# Patient Record
Sex: FEMALE | Race: WHITE | NOT HISPANIC OR LATINO | Employment: FULL TIME | URBAN - METROPOLITAN AREA
[De-identification: names, ages, dates, MRNs, and addresses within clinical notes are randomized per-mention and may not be internally consistent; named-entity substitution may affect disease eponyms.]

---

## 2018-12-12 ENCOUNTER — OFFICE VISIT (OUTPATIENT)
Dept: URGENT CARE | Facility: CLINIC | Age: 53
End: 2018-12-12

## 2018-12-12 VITALS
HEART RATE: 116 BPM | HEIGHT: 62 IN | RESPIRATION RATE: 16 BRPM | SYSTOLIC BLOOD PRESSURE: 110 MMHG | OXYGEN SATURATION: 96 % | DIASTOLIC BLOOD PRESSURE: 66 MMHG | TEMPERATURE: 98 F | BODY MASS INDEX: 36.8 KG/M2 | WEIGHT: 200 LBS

## 2018-12-12 DIAGNOSIS — K52.9 NONINFECTIOUS GASTROENTERITIS, UNSPECIFIED TYPE: ICD-10-CM

## 2018-12-12 DIAGNOSIS — J06.9 UPPER RESPIRATORY TRACT INFECTION, UNSPECIFIED TYPE: Primary | ICD-10-CM

## 2018-12-12 PROCEDURE — 99213 OFFICE O/P EST LOW 20 MIN: CPT | Performed by: EMERGENCY MEDICINE

## 2018-12-12 RX ORDER — TAMOXIFEN CITRATE 10 MG/1
10 TABLET ORAL 2 TIMES DAILY
COMMUNITY

## 2018-12-12 NOTE — LETTER
December 12, 2018     Patient: Giuliana Burr   YOB: 1965   Date of Visit: 12/12/2018       To Whom It May Concern: It is my medical opinion that Giuliana Burr may return to work on 12/12/2018  If you have any questions or concerns, please don't hesitate to call           Sincerely,        Kimberly Duong MD    CC: No Recipients

## 2018-12-12 NOTE — PATIENT INSTRUCTIONS
Clear liquid diet, advance to bland solids as tolerated (Bananas, Rice, Applesauce, Toast), decongestants as needed, rest, recheck as needed

## 2018-12-13 NOTE — PROGRESS NOTES
Assessment/Plan:    No problem-specific Assessment & Plan notes found for this encounter  Diagnoses and all orders for this visit:    Upper respiratory tract infection, unspecified type    Noninfectious gastroenteritis, unspecified type    Other orders  -     tamoxifen (NOLVADEX) 10 mg tablet; Take 10 mg by mouth 2 (two) times a day          Subjective:      Patient ID: Wilfredo Calhoun is a 47 y o  female  Pt states she had the "flu" for 5 days, needs a note to return to work  States she had cold, runny nose, sore throat, followed by  N & V, diarrhea for 3 days; now is able to tolerate fluids, still feels weak, though  I explained that neither of these processes was the flu, but that she would be OK to return to work      Vomiting    This is a new problem  The current episode started in the past 7 days  The problem occurs intermittently  The problem has been resolved  The emesis has an appearance of bile  The maximum temperature recorded prior to her arrival was 100 4 - 100 9 F  Associated symptoms include abdominal pain, coughing, diarrhea and URI  She has tried nothing for the symptoms  The treatment provided no relief  URI    This is a new problem  The current episode started in the past 7 days  The problem has been gradually improving  The maximum temperature recorded prior to her arrival was 100 4 - 100 9 F  Associated symptoms include abdominal pain, congestion, coughing, diarrhea, rhinorrhea and vomiting  She has tried nothing for the symptoms  The treatment provided no relief  The following portions of the patient's history were reviewed and updated as appropriate: current medications, past family history, past medical history, past social history, past surgical history and problem list     Review of Systems   HENT: Positive for congestion and rhinorrhea  Respiratory: Positive for cough  Pt is a smoker   Gastrointestinal: Positive for abdominal pain, diarrhea and vomiting  All other systems reviewed and are negative  Objective:      /66   Pulse (!) 116   Temp 98 °F (36 7 °C)   Resp 16   Ht 5' 2" (1 575 m)   Wt 90 7 kg (200 lb)   SpO2 96%   BMI 36 58 kg/m²          Physical Exam   Constitutional: She is oriented to person, place, and time  She appears well-developed and well-nourished  HENT:   Right Ear: External ear normal    Left Ear: External ear normal    Nose: Nose normal    Mouth/Throat: Oropharynx is clear and moist    Eyes: Pupils are equal, round, and reactive to light  Neck: Normal range of motion  Cardiovascular: Normal rate  Pulmonary/Chest: Effort normal    Abdominal: Soft  Neurological: She is alert and oriented to person, place, and time  Skin: Skin is warm and dry  Psychiatric: She has a normal mood and affect  Her behavior is normal  Judgment and thought content normal    Nursing note and vitals reviewed

## 2018-12-14 ENCOUNTER — APPOINTMENT (EMERGENCY)
Dept: RADIOLOGY | Facility: HOSPITAL | Age: 53
DRG: 379 | End: 2018-12-14
Payer: COMMERCIAL

## 2018-12-14 ENCOUNTER — HOSPITAL ENCOUNTER (INPATIENT)
Facility: HOSPITAL | Age: 53
LOS: 3 days | Discharge: HOME | DRG: 379 | End: 2018-12-17
Attending: EMERGENCY MEDICINE | Admitting: INTERNAL MEDICINE
Payer: COMMERCIAL

## 2018-12-14 ENCOUNTER — APPOINTMENT (EMERGENCY)
Dept: CT IMAGING | Facility: HOSPITAL | Age: 53
DRG: 379 | End: 2018-12-14
Payer: COMMERCIAL

## 2018-12-14 DIAGNOSIS — R11.2 NAUSEA AND VOMITING: ICD-10-CM

## 2018-12-14 DIAGNOSIS — R53.1 RIGHT SIDED WEAKNESS: Primary | ICD-10-CM

## 2018-12-14 DIAGNOSIS — R10.9 ABDOMINAL PAIN: ICD-10-CM

## 2018-12-14 DIAGNOSIS — K92.2 UPPER GASTROINTESTINAL BLEED: ICD-10-CM

## 2018-12-14 DIAGNOSIS — K29.01 ACUTE GASTRITIS WITH HEMORRHAGE: ICD-10-CM

## 2018-12-14 LAB
ABO GROUP BLD: NORMAL
ALBUMIN SERPL BCP-MCNC: 3.8 G/DL (ref 3.5–5)
ALP SERPL-CCNC: 72 U/L (ref 46–116)
ALT SERPL W P-5'-P-CCNC: 26 U/L (ref 12–78)
AMPHETAMINES SERPL QL SCN: NEGATIVE
ANION GAP SERPL CALCULATED.3IONS-SCNC: 16 MMOL/L (ref 4–13)
APAP SERPL-MCNC: <2 UG/ML (ref 10–30)
APTT PPP: 25 SECONDS (ref 26–38)
AST SERPL W P-5'-P-CCNC: 14 U/L (ref 5–45)
BARBITURATES UR QL: NEGATIVE
BENZODIAZ UR QL: NEGATIVE
BILIRUB SERPL-MCNC: 0.5 MG/DL (ref 0.2–1)
BLD GP AB SCN SERPL QL: NEGATIVE
BUN SERPL-MCNC: 13 MG/DL (ref 5–25)
CALCIUM SERPL-MCNC: 9.6 MG/DL (ref 8.3–10.1)
CHLORIDE SERPL-SCNC: 102 MMOL/L (ref 100–108)
CLARITY, POC: CLEAR
CO2 SERPL-SCNC: 21 MMOL/L (ref 21–32)
COCAINE UR QL: NEGATIVE
COLOR, POC: NORMAL
CREAT SERPL-MCNC: 1.27 MG/DL (ref 0.6–1.3)
ERYTHROCYTE [DISTWIDTH] IN BLOOD BY AUTOMATED COUNT: 13.7 % (ref 11.6–15.1)
ETHANOL SERPL-MCNC: <3 MG/DL (ref 0–3)
EXT BILIRUBIN, UA: NORMAL
EXT BLOOD URINE: NORMAL
EXT GLUCOSE, UA: NORMAL
EXT KETONES: NORMAL
EXT NITRITE, UA: NORMAL
EXT PH, UA: 6.2
EXT PROTEIN, UA: NORMAL
EXT SPECIFIC GRAVITY, UA: 1.01
EXT UROBILINOGEN: NORMAL
GFR SERPL CREATININE-BSD FRML MDRD: 49 ML/MIN/1.73SQ M
GLUCOSE SERPL-MCNC: 114 MG/DL (ref 65–140)
GLUCOSE SERPL-MCNC: 124 MG/DL (ref 65–140)
HCT VFR BLD AUTO: 46.7 % (ref 34.8–46.1)
HGB BLD-MCNC: 15.3 G/DL (ref 11.5–15.4)
INR PPP: 1 (ref 0.86–1.17)
LIPASE SERPL-CCNC: 64 U/L (ref 73–393)
MCH RBC QN AUTO: 28.5 PG (ref 26.8–34.3)
MCHC RBC AUTO-ENTMCNC: 32.8 G/DL (ref 31.4–37.4)
MCV RBC AUTO: 87 FL (ref 82–98)
METHADONE UR QL: NEGATIVE
OB PNL GAST: POSITIVE
OPIATES UR QL SCN: NEGATIVE
PCP UR QL: NEGATIVE
PLATELET # BLD AUTO: 415 THOUSANDS/UL (ref 149–390)
PMV BLD AUTO: 9.6 FL (ref 8.9–12.7)
POTASSIUM SERPL-SCNC: 4.3 MMOL/L (ref 3.5–5.3)
PROT SERPL-MCNC: 8.4 G/DL (ref 6.4–8.2)
PROTHROMBIN TIME: 12.6 SECONDS (ref 11.8–14.2)
RBC # BLD AUTO: 5.36 MILLION/UL (ref 3.81–5.12)
RH BLD: POSITIVE
SALICYLATES SERPL-MCNC: <3 MG/DL (ref 3–20)
SODIUM SERPL-SCNC: 139 MMOL/L (ref 136–145)
SPECIMEN EXPIRATION DATE: NORMAL
THC UR QL: POSITIVE
WBC # BLD AUTO: 16.87 THOUSAND/UL (ref 4.31–10.16)
WBC # BLD EST: NORMAL 10*3/UL

## 2018-12-14 PROCEDURE — 82271 OCCULT BLOOD OTHER SOURCES: CPT | Performed by: EMERGENCY MEDICINE

## 2018-12-14 PROCEDURE — 81002 URINALYSIS NONAUTO W/O SCOPE: CPT | Performed by: EMERGENCY MEDICINE

## 2018-12-14 PROCEDURE — 93005 ELECTROCARDIOGRAM TRACING: CPT

## 2018-12-14 PROCEDURE — 85730 THROMBOPLASTIN TIME PARTIAL: CPT | Performed by: EMERGENCY MEDICINE

## 2018-12-14 PROCEDURE — 80320 DRUG SCREEN QUANTALCOHOLS: CPT | Performed by: EMERGENCY MEDICINE

## 2018-12-14 PROCEDURE — 80307 DRUG TEST PRSMV CHEM ANLYZR: CPT | Performed by: EMERGENCY MEDICINE

## 2018-12-14 PROCEDURE — 80329 ANALGESICS NON-OPIOID 1 OR 2: CPT | Performed by: EMERGENCY MEDICINE

## 2018-12-14 PROCEDURE — 74177 CT ABD & PELVIS W/CONTRAST: CPT

## 2018-12-14 PROCEDURE — 96375 TX/PRO/DX INJ NEW DRUG ADDON: CPT

## 2018-12-14 PROCEDURE — 86901 BLOOD TYPING SEROLOGIC RH(D): CPT | Performed by: EMERGENCY MEDICINE

## 2018-12-14 PROCEDURE — 83690 ASSAY OF LIPASE: CPT | Performed by: EMERGENCY MEDICINE

## 2018-12-14 PROCEDURE — 70496 CT ANGIOGRAPHY HEAD: CPT

## 2018-12-14 PROCEDURE — 96374 THER/PROPH/DIAG INJ IV PUSH: CPT

## 2018-12-14 PROCEDURE — 99285 EMERGENCY DEPT VISIT HI MDM: CPT

## 2018-12-14 PROCEDURE — 85027 COMPLETE CBC AUTOMATED: CPT | Performed by: EMERGENCY MEDICINE

## 2018-12-14 PROCEDURE — 36415 COLL VENOUS BLD VENIPUNCTURE: CPT | Performed by: EMERGENCY MEDICINE

## 2018-12-14 PROCEDURE — 86850 RBC ANTIBODY SCREEN: CPT | Performed by: EMERGENCY MEDICINE

## 2018-12-14 PROCEDURE — 71045 X-RAY EXAM CHEST 1 VIEW: CPT

## 2018-12-14 PROCEDURE — 70498 CT ANGIOGRAPHY NECK: CPT

## 2018-12-14 PROCEDURE — 85610 PROTHROMBIN TIME: CPT | Performed by: EMERGENCY MEDICINE

## 2018-12-14 PROCEDURE — 80053 COMPREHEN METABOLIC PANEL: CPT | Performed by: EMERGENCY MEDICINE

## 2018-12-14 PROCEDURE — 86900 BLOOD TYPING SEROLOGIC ABO: CPT | Performed by: EMERGENCY MEDICINE

## 2018-12-14 PROCEDURE — 82948 REAGENT STRIP/BLOOD GLUCOSE: CPT

## 2018-12-14 RX ORDER — METOCLOPRAMIDE HYDROCHLORIDE 5 MG/ML
10 INJECTION INTRAMUSCULAR; INTRAVENOUS ONCE
Status: COMPLETED | OUTPATIENT
Start: 2018-12-14 | End: 2018-12-14

## 2018-12-14 RX ORDER — ONDANSETRON 2 MG/ML
4 INJECTION INTRAMUSCULAR; INTRAVENOUS ONCE
Status: COMPLETED | OUTPATIENT
Start: 2018-12-14 | End: 2018-12-14

## 2018-12-14 RX ADMIN — ONDANSETRON 4 MG: 2 INJECTION INTRAMUSCULAR; INTRAVENOUS at 21:57

## 2018-12-14 RX ADMIN — METOCLOPRAMIDE 10 MG: 5 INJECTION, SOLUTION INTRAMUSCULAR; INTRAVENOUS at 23:05

## 2018-12-14 RX ADMIN — IOHEXOL 85 ML: 350 INJECTION, SOLUTION INTRAVENOUS at 21:39

## 2018-12-14 NOTE — LETTER
[address] [id number]  Dept: [phone]    December 23, 2018     Patient: Natalya Darby   YOB: 1965   Date of Visit: 12/14/2018       To Whom it May Concern:    Natalya Darby is under my professional care  She was seen in the hospital from 12/14/2018   to 12/17/18  She may return to work on 12/19/2018  If you have any questions or concerns, please don't hesitate to call           Sincerely,          Arabella Pappas MD

## 2018-12-15 ENCOUNTER — APPOINTMENT (INPATIENT)
Dept: MRI IMAGING | Facility: HOSPITAL | Age: 53
DRG: 379 | End: 2018-12-15
Payer: COMMERCIAL

## 2018-12-15 PROBLEM — R11.2 NAUSEA AND VOMITING: Status: ACTIVE | Noted: 2018-12-15

## 2018-12-15 PROBLEM — R53.1 RIGHT SIDED WEAKNESS: Status: ACTIVE | Noted: 2018-12-15

## 2018-12-15 PROBLEM — K92.2 UPPER GASTROINTESTINAL BLEED: Status: ACTIVE | Noted: 2018-12-15

## 2018-12-15 LAB
ALBUMIN SERPL BCP-MCNC: 3.5 G/DL (ref 3.5–5)
ALP SERPL-CCNC: 62 U/L (ref 46–116)
ALT SERPL W P-5'-P-CCNC: 24 U/L (ref 12–78)
ANION GAP SERPL CALCULATED.3IONS-SCNC: 14 MMOL/L (ref 4–13)
AST SERPL W P-5'-P-CCNC: 22 U/L (ref 5–45)
BASOPHILS # BLD AUTO: 0.05 THOUSANDS/ΜL (ref 0–0.1)
BASOPHILS NFR BLD AUTO: 0 % (ref 0–1)
BILIRUB SERPL-MCNC: 0.4 MG/DL (ref 0.2–1)
BUN SERPL-MCNC: 11 MG/DL (ref 5–25)
CALCIUM SERPL-MCNC: 9.1 MG/DL (ref 8.3–10.1)
CHLORIDE SERPL-SCNC: 103 MMOL/L (ref 100–108)
CHOLEST SERPL-MCNC: 163 MG/DL (ref 50–200)
CO2 SERPL-SCNC: 22 MMOL/L (ref 21–32)
CREAT SERPL-MCNC: 0.92 MG/DL (ref 0.6–1.3)
EOSINOPHIL # BLD AUTO: 0 THOUSAND/ΜL (ref 0–0.61)
EOSINOPHIL NFR BLD AUTO: 0 % (ref 0–6)
ERYTHROCYTE [DISTWIDTH] IN BLOOD BY AUTOMATED COUNT: 13.9 % (ref 11.6–15.1)
EST. AVERAGE GLUCOSE BLD GHB EST-MCNC: 114 MG/DL
GFR SERPL CREATININE-BSD FRML MDRD: 72 ML/MIN/1.73SQ M
GLUCOSE SERPL-MCNC: 109 MG/DL (ref 65–140)
HBA1C MFR BLD: 5.6 % (ref 4.2–6.3)
HCT VFR BLD AUTO: 43.8 % (ref 34.8–46.1)
HCT VFR BLD AUTO: 44.2 % (ref 34.8–46.1)
HCT VFR BLD AUTO: 44.4 % (ref 34.8–46.1)
HDLC SERPL-MCNC: 59 MG/DL (ref 40–60)
HGB BLD-MCNC: 14.3 G/DL (ref 11.5–15.4)
HGB BLD-MCNC: 14.5 G/DL (ref 11.5–15.4)
HGB BLD-MCNC: 14.5 G/DL (ref 11.5–15.4)
IMM GRANULOCYTES # BLD AUTO: 0.08 THOUSAND/UL (ref 0–0.2)
IMM GRANULOCYTES NFR BLD AUTO: 0 % (ref 0–2)
LDLC SERPL CALC-MCNC: 85 MG/DL (ref 0–100)
LYMPHOCYTES # BLD AUTO: 2.48 THOUSANDS/ΜL (ref 0.6–4.47)
LYMPHOCYTES NFR BLD AUTO: 13 % (ref 14–44)
MAGNESIUM SERPL-MCNC: 2.1 MG/DL (ref 1.6–2.6)
MCH RBC QN AUTO: 28.8 PG (ref 26.8–34.3)
MCHC RBC AUTO-ENTMCNC: 32.6 G/DL (ref 31.4–37.4)
MCV RBC AUTO: 88 FL (ref 82–98)
MONOCYTES # BLD AUTO: 1.37 THOUSAND/ΜL (ref 0.17–1.22)
MONOCYTES NFR BLD AUTO: 7 % (ref 4–12)
NEUTROPHILS # BLD AUTO: 15.37 THOUSANDS/ΜL (ref 1.85–7.62)
NEUTS SEG NFR BLD AUTO: 80 % (ref 43–75)
NRBC BLD AUTO-RTO: 0 /100 WBCS
PHOSPHATE SERPL-MCNC: 4.1 MG/DL (ref 2.7–4.5)
PLATELET # BLD AUTO: 368 THOUSANDS/UL (ref 149–390)
PMV BLD AUTO: 10.2 FL (ref 8.9–12.7)
POTASSIUM SERPL-SCNC: 3.9 MMOL/L (ref 3.5–5.3)
PROCALCITONIN SERPL-MCNC: <0.05 NG/ML
PROT SERPL-MCNC: 7.7 G/DL (ref 6.4–8.2)
RBC # BLD AUTO: 4.97 MILLION/UL (ref 3.81–5.12)
SODIUM SERPL-SCNC: 139 MMOL/L (ref 136–145)
TRIGL SERPL-MCNC: 93 MG/DL
WBC # BLD AUTO: 19.35 THOUSAND/UL (ref 4.31–10.16)

## 2018-12-15 PROCEDURE — 84145 PROCALCITONIN (PCT): CPT | Performed by: INTERNAL MEDICINE

## 2018-12-15 PROCEDURE — 99255 IP/OBS CONSLTJ NEW/EST HI 80: CPT | Performed by: PSYCHIATRY & NEUROLOGY

## 2018-12-15 PROCEDURE — 80061 LIPID PANEL: CPT | Performed by: INTERNAL MEDICINE

## 2018-12-15 PROCEDURE — 85018 HEMOGLOBIN: CPT | Performed by: INTERNAL MEDICINE

## 2018-12-15 PROCEDURE — 80053 COMPREHEN METABOLIC PANEL: CPT | Performed by: INTERNAL MEDICINE

## 2018-12-15 PROCEDURE — C9113 INJ PANTOPRAZOLE SODIUM, VIA: HCPCS | Performed by: INTERNAL MEDICINE

## 2018-12-15 PROCEDURE — 83735 ASSAY OF MAGNESIUM: CPT | Performed by: INTERNAL MEDICINE

## 2018-12-15 PROCEDURE — 85025 COMPLETE CBC W/AUTO DIFF WBC: CPT | Performed by: INTERNAL MEDICINE

## 2018-12-15 PROCEDURE — 83036 HEMOGLOBIN GLYCOSYLATED A1C: CPT | Performed by: INTERNAL MEDICINE

## 2018-12-15 PROCEDURE — 99223 1ST HOSP IP/OBS HIGH 75: CPT | Performed by: INTERNAL MEDICINE

## 2018-12-15 PROCEDURE — 84100 ASSAY OF PHOSPHORUS: CPT | Performed by: INTERNAL MEDICINE

## 2018-12-15 PROCEDURE — 85014 HEMATOCRIT: CPT | Performed by: INTERNAL MEDICINE

## 2018-12-15 PROCEDURE — 70551 MRI BRAIN STEM W/O DYE: CPT

## 2018-12-15 RX ORDER — ACETAMINOPHEN 325 MG/1
650 TABLET ORAL EVERY 4 HOURS PRN
Status: DISCONTINUED | OUTPATIENT
Start: 2018-12-15 | End: 2018-12-17 | Stop reason: HOSPADM

## 2018-12-15 RX ORDER — ATORVASTATIN CALCIUM 40 MG/1
40 TABLET, FILM COATED ORAL EVERY EVENING
Status: DISCONTINUED | OUTPATIENT
Start: 2018-12-15 | End: 2018-12-16

## 2018-12-15 RX ORDER — NICOTINE 21 MG/24HR
1 PATCH, TRANSDERMAL 24 HOURS TRANSDERMAL DAILY
Status: DISCONTINUED | OUTPATIENT
Start: 2018-12-15 | End: 2018-12-17 | Stop reason: HOSPADM

## 2018-12-15 RX ORDER — ONDANSETRON 2 MG/ML
4 INJECTION INTRAMUSCULAR; INTRAVENOUS EVERY 6 HOURS PRN
Status: DISCONTINUED | OUTPATIENT
Start: 2018-12-15 | End: 2018-12-17 | Stop reason: HOSPADM

## 2018-12-15 RX ORDER — TAMOXIFEN CITRATE 10 MG/1
10 TABLET ORAL DAILY
Status: DISCONTINUED | OUTPATIENT
Start: 2018-12-15 | End: 2018-12-17 | Stop reason: HOSPADM

## 2018-12-15 RX ORDER — TAMOXIFEN CITRATE 10 MG/1
10 TABLET ORAL DAILY
COMMUNITY

## 2018-12-15 RX ORDER — PANTOPRAZOLE SODIUM 40 MG/1
40 INJECTION, POWDER, FOR SOLUTION INTRAVENOUS EVERY 12 HOURS SCHEDULED
Status: DISCONTINUED | OUTPATIENT
Start: 2018-12-15 | End: 2018-12-17 | Stop reason: HOSPADM

## 2018-12-15 RX ADMIN — PANTOPRAZOLE SODIUM 40 MG: 40 INJECTION, POWDER, FOR SOLUTION INTRAVENOUS at 01:49

## 2018-12-15 RX ADMIN — PANTOPRAZOLE SODIUM 40 MG: 40 INJECTION, POWDER, FOR SOLUTION INTRAVENOUS at 09:18

## 2018-12-15 RX ADMIN — PANTOPRAZOLE SODIUM 40 MG: 40 INJECTION, POWDER, FOR SOLUTION INTRAVENOUS at 21:58

## 2018-12-15 RX ADMIN — ONDANSETRON 4 MG: 2 INJECTION, SOLUTION INTRAMUSCULAR; INTRAVENOUS at 23:37

## 2018-12-15 RX ADMIN — TAMOXIFEN CITRATE 10 MG: 10 TABLET ORAL at 09:18

## 2018-12-15 RX ADMIN — ATORVASTATIN CALCIUM 40 MG: 40 TABLET, FILM COATED ORAL at 17:58

## 2018-12-15 NOTE — ED PROVIDER NOTES
History  Chief Complaint   Patient presents with    Vomiting     Pt c/o of nuaea and vomiting and generlized pain  47year old female presents for evaluation of nausea, vomiting and weakness  Patient states that she had been feeling unwell since 4 am on 12/14 with multiple episodes of bilious emesis  Patient states that she has been hurting "all over" with myalgias, abdominal pain and headache beginning after onset of the nausea and vomiting  Patient denies fevers, chills or URI symptoms  Patient reports generalized weakness with her symptoms; however, EMS noted right sided weakness during transit  Unclear time of onset of symptoms  Patient noted to be able to use her right upper and lower extremities when not being directly examined  No blood thinners  No recent trauma  Patient takes tamoxifen for history of breast cancer s/p right lumpectomy  History provided by:  Patient and EMS personnel  CVA/TIA-like Symptoms   Presenting symptoms: headaches and weakness    Weakness:     Severity:  Unable to specify    Onset quality:  Unable to specify    Timing:  Unable to specify    Progression:  Worsening    Chronicity:  New  Onset quality:  Unable to specify  Timing:  Unable to specify  Progression:  Worsening  Associated symptoms: nausea and vomiting    Associated symptoms: no chest pain, no fever and no neck pain    Nausea:     Severity:  Severe    Onset quality:  Sudden    Duration:  17 hours    Timing:  Constant    Progression:  Worsening  Vomiting:     Quality:  Bilious material    Number of occurrences:  Multiple    Severity:  Severe    Duration:  17 hours    Timing:  Constant    Progression:  Unchanged      Prior to Admission Medications   Prescriptions Last Dose Informant Patient Reported?  Taking?   tamoxifen (NOLVADEX) 10 mg tablet   Yes Yes   Sig: Take 10 mg by mouth daily        Facility-Administered Medications: None       Past Medical History:   Diagnosis Date    Cholecystolithiasis        Past Surgical History:   Procedure Laterality Date    BREAST LUMPECTOMY Right     CHOLECYSTECTOMY      HYSTERECTOMY      KNEE SURGERY      TONSILLECTOMY         Family History   Problem Relation Age of Onset    Cancer Mother     Heart disease Father      I have reviewed and agree with the history as documented  Social History   Substance Use Topics    Smoking status: Current Every Day Smoker     Packs/day: 1 00     Years: 40 00    Smokeless tobacco: Never Used    Alcohol use No        Review of Systems   Constitutional: Negative for appetite change, chills and fever  HENT: Negative for congestion, rhinorrhea and sore throat  Respiratory: Negative for cough, chest tightness and shortness of breath  Cardiovascular: Negative for chest pain, palpitations and leg swelling  Gastrointestinal: Positive for abdominal pain, nausea and vomiting  Negative for constipation and diarrhea  Genitourinary: Negative for dysuria, frequency and hematuria  Musculoskeletal: Positive for myalgias  Negative for neck pain and neck stiffness  Skin: Negative for pallor  Neurological: Positive for weakness and headaches  Negative for syncope  All other systems reviewed and are negative  Physical Exam  Physical Exam   Constitutional: She is oriented to person, place, and time  She appears well-developed and well-nourished  Non-toxic appearance  No distress  HENT:   Head: Normocephalic and atraumatic  Eyes: Pupils are equal, round, and reactive to light  Conjunctivae and EOM are normal    Neck: Normal range of motion  Neck supple  No tracheal deviation present  No thyromegaly present  Cardiovascular: Normal rate, regular rhythm, normal heart sounds and intact distal pulses  Pulmonary/Chest: Effort normal and breath sounds normal    Abdominal: Soft  Bowel sounds are normal  She exhibits no distension  There is no tenderness  Lymphadenopathy:     She has no cervical adenopathy     Neurological: She is alert and oriented to person, place, and time  A sensory deficit (decreased sensation right lower extremity) is present  No cranial nerve deficit  She exhibits normal muscle tone  4/5 strength right upper extremity, 3/5 strength right lower extremity  5/5 strength left upper and lower extremity  Skin: Skin is warm and dry  She is not diaphoretic  Nursing note and vitals reviewed        Vital Signs  ED Triage Vitals   Temperature Pulse Respirations Blood Pressure SpO2   12/14/18 2100 12/14/18 2100 12/14/18 2100 12/14/18 2100 12/14/18 2100   97 7 °F (36 5 °C) 66 19 163/82 95 %      Temp Source Heart Rate Source Patient Position - Orthostatic VS BP Location FiO2 (%)   12/14/18 2100 12/14/18 2100 12/14/18 2100 12/15/18 0046 --   Tympanic Monitor Lying Right arm       Pain Score       12/15/18 0046       2           Vitals:    12/14/18 2145 12/14/18 2215 12/15/18 0046 12/15/18 0254   BP: (!) 158/104 (!) 158/104 167/80 137/84   Pulse: 87 71 67 82   Patient Position - Orthostatic VS: Lying  Lying Lying       Visual Acuity  Visual Acuity      Most Recent Value   L Pupil Size (mm)  2   R Pupil Size (mm)  2          ED Medications  Medications   tamoxifen (NOLVADEX) tablet 10 mg (not administered)   ondansetron (ZOFRAN) injection 4 mg (not administered)   acetaminophen (TYLENOL) tablet 650 mg (not administered)   nicotine (NICODERM CQ) 21 mg/24 hr TD 24 hr patch 1 patch (not administered)   atorvastatin (LIPITOR) tablet 40 mg (not administered)   pantoprazole (PROTONIX) injection 40 mg (40 mg Intravenous Given 12/15/18 0149)   influenza vaccine, recombinant, quadrivalent (FLUBLOK) IM injection 0 5 mL (not administered)   iohexol (OMNIPAQUE) 350 MG/ML injection (MULTI-DOSE) 85 mL (85 mL Intravenous Given 12/14/18 2139)   ondansetron (ZOFRAN) injection 4 mg (4 mg Intravenous Given 12/14/18 2157)   metoclopramide (REGLAN) injection 10 mg (10 mg Intravenous Given 12/14/18 2305)       Diagnostic Studies  Results Reviewed Procedure Component Value Units Date/Time    Occult blood gastric / duodenum [[id number]]  (Abnormal) Collected:  12/14/18 2304    Lab Status:  Final result Specimen: Body Fluid from Stomach Updated:  12/14/18 2336     Occult Blood, Gastric Positive (A)    Rapid drug screen, urine [[id number]]  (Abnormal) Collected:  12/14/18 2253    Lab Status:  Final result Specimen:  Urine from Urine, Clean Catch Updated:  12/14/18 2315     Amph/Meth UR Negative     Barbiturate Ur Negative     Benzodiazepine Urine Negative     Cocaine Urine Negative     Methadone Urine Negative     Opiate Urine Negative     PCP Ur Negative     THC Urine Positive (A)    Narrative:         Presumptive report  If requested, specimen will be sent to reference lab for confirmation  FOR MEDICAL PURPOSES ONLY  IF CONFIRMATION NEEDED PLEASE CONTACT THE LAB WITHIN 5 DAYS      Drug Screen Cutoff Levels:  AMPHETAMINE/METHAMPHETAMINES  1000 ng/mL  BARBITURATES     200 ng/mL  BENZODIAZEPINES     200 ng/mL  COCAINE      300 ng/mL  METHADONE      300 ng/mL  OPIATES      300 ng/mL  PHENCYCLIDINE     25 ng/mL  THC       50 ng/mL    UA w Reflex to Microscopic w Reflex to Culture [[id number]]     Lab Status:  No result Specimen:  Urine     POCT urinalysis dipstick [[id number]]  (Normal) Resulted:  12/14/18 2309    Lab Status:  Final result Specimen:  Urine Updated:  12/14/18 2310     Color, UA yello     Clarity, UA clear     Glucose, UA (Ref: Negative) neg     Bilirubin, UA (Ref: Negative) neg     Ketones, UA (Ref: Negative) trace     Spec Grav, UA (Ref:1 003-1 030) 1 010     Blood, UA (Ref: Negative) tarce     pH, UA (Ref: 4 5-8 0) 6 2     Protein, UA (Ref: Negative) neg     Urobilinogen, UA (Ref: 0 2- 1 0) neg      Leukocytes, UA (Ref: Negative) neg     Nitrite, UA (Ref: Negative) neg    Ethanol [[id number]]  (Normal) Collected:  12/14/18 2202    Lab Status:  Final result Specimen:  Blood Updated:  12/14/18 2256     Ethanol Lvl <3 mg/dL     Salicylate level [[id number]]  (Abnormal) Collected:  12/14/18 2202    Lab Status:  Final result Specimen:  Blood Updated:  70/78/84 4559     Salicylate Lvl <3 (L) mg/dL     Acetaminophen level [[id number]]  (Abnormal) Collected:  12/14/18 2202    Lab Status:  Final result Specimen:  Blood Updated:  12/14/18 2251     Acetaminophen Level <2 0 (L) ug/mL     Comprehensive metabolic panel [[id number]]  (Abnormal) Collected:  12/14/18 2202    Lab Status:  Final result Specimen:  Blood from Line, Venous Updated:  12/14/18 2235     Sodium 139 mmol/L      Potassium 4 3 mmol/L      Chloride 102 mmol/L      CO2 21 mmol/L      ANION GAP 16 (H) mmol/L      BUN 13 mg/dL      Creatinine 1 27 mg/dL      Glucose 124 mg/dL      Calcium 9 6 mg/dL      AST 14 U/L      ALT 26 U/L      Alkaline Phosphatase 72 U/L      Total Protein 8 4 (H) g/dL      Albumin 3 8 g/dL      Total Bilirubin 0 50 mg/dL      eGFR 49 ml/min/1 73sq m     Narrative:         National Kidney Disease Education Program recommendations are as follows:  GFR calculation is accurate only with a steady state creatinine  Chronic Kidney disease less than 60 ml/min/1 73 sq  meters  Kidney failure less than 15 ml/min/1 73 sq  meters      Lipase [[id number]]  (Abnormal) Collected:  12/14/18 2202    Lab Status:  Final result Specimen:  Blood from Line, Venous Updated:  12/14/18 2235     Lipase 64 (L) u/L     APTT [[id number]]  (Abnormal) Collected:  12/14/18 2202    Lab Status:  Final result Specimen:  Blood from Line, Venous Updated:  12/14/18 2232     PTT 25 (L) seconds     Protime-INR [[id number]]  (Normal) Collected:  12/14/18 2202    Lab Status:  Final result Specimen:  Blood from Line, Venous Updated:  12/14/18 2232     Protime 12 6 seconds      INR 1 00    CBC [[id number]]  (Abnormal) Collected:  12/14/18 2202    Lab Status:  Final result Specimen:  Blood from Line, Venous Updated:  12/14/18 2213     WBC 16 87 (H) Thousand/uL      RBC 5 36 (H) Million/uL      Hemoglobin 15 3 g/dL Hematocrit 46 7 (H) %      MCV 87 fL      MCH 28 5 pg      MCHC 32 8 g/dL      RDW 13 7 %      Platelets 718 (H) Thousands/uL      MPV 9 6 fL     Fingerstick Glucose (POCT) [[id number]]  (Normal) Collected:  12/14/18 2206    Lab Status:  Final result Updated:  12/14/18 2207     POC Glucose 114 mg/dl                  X-ray chest 1 view portable   ED Interpretation by Miya Lange MD (12/14 2311)   No acute pulmonary pathology      CT abdomen pelvis with contrast   Final Result by Benja Childress MD (12/14 2145)      No acute CT findings  Scattered colonic diverticula  Workstation performed: [id number]         CTA stroke alert (head/neck)   Final Result by Benja Childress MD (12/14 2138)      No evidence of acute intracranial hemorrhage  No evidence of hemodynamic significant stenosis, aneurysm or dissection  Large pituitary fossa, follow-up nonemergent outpatient contrast enhanced brain MRI           I personally discussed this study with Morenita Shah on 12/14/2018 at 9:19PM                      Workstation performed: [id number]         MRI Inpatient Order    (Results Pending)              Procedures  ECG 12 Lead Documentation  Date/Time: 12/14/2018 9:50 PM  Performed by: Elle Limaer by: Karlo Lal     Indications / Diagnosis:  Possible stroke  ECG reviewed by me, the ED Provider: yes    Patient location:  ED  Previous ECG:     Previous ECG:  Unavailable  Interpretation:     Interpretation: normal    Rate:     ECG rate:  73    ECG rate assessment: normal    Rhythm:     Rhythm: sinus rhythm    Ectopy:     Ectopy: none    QRS:     QRS axis:  Normal    QRS intervals:  Normal  Conduction:     Conduction: normal    ST segments:     ST segments:  Normal  T waves:     T waves: inverted      Inverted:  AVL and V2           Phone Contacts  ED Phone Contact    ED Course  ED Course as of Dec 15 0305   Fri Dec 14, 2018   [facility] URINE: (!) Positive                   Stroke Assessment [address] Name 12/14/18 3165             NIH Stroke Scale    Interval Baseline      Level of Consciousness (1a ) 0      LOC Questions (1b ) 0      LOC Commands (1c ) 0      Best Gaze (2 ) 0      Visual (3 ) 0      Facial Palsy (4 ) 0      Motor Arm, Left (5a ) 0      Motor Arm, Right (5b ) 0      Motor Leg, Left (6a ) 0      Motor Leg, Right (6b ) 2      Limb Ataxia (7 ) 0      Sensory (8 ) 1      Best Language (9 ) 0      Dysarthria (10 ) 0      Extinction and Inattention (11 ) (Formerly Neglect) 0      Total 3                First Filed Value   TPA Decision  Patient not a TPA candidate  Patient is not a candidate options  Unclear time of onset outside appropriate time window  MDM  Number of Diagnoses or Management Options  Abdominal pain: new and requires workup  Nausea and vomiting: new and requires workup  Right sided weakness: new and requires workup  Upper gastrointestinal bleed: new and requires workup  Diagnosis management comments: 47year old female presents for evaluation of nausea, vomiting, body aches and right sided weakness  Stroke alert called on arrival  NIHSS 3  Concern for possible conversion disorder as symptoms only apparent during direct testing  Unclear time of onset of symptoms as patient was not with family and does not remember when she first noticed the weakness  tPA not given due to unclear time of onset  CTA head and neck unremarkable  Patient retching in ED despite 4 mg zofran by EMS and additional 4 mg zofran after arrival  10 mg reglan given with resolution of nausea and vomiting; however, patient noted to have coffee ground component to emesis  Emesis sent to lab for Gastroccult which was positive  Given bleeding diathesis risk of tpa likely exceeds benefit with low NIHSS and fluctuating symptoms  Likely gastric ulcer vs earl krista tear  CT abd/pelvis ordered for generalized tenderness on exam and was unremarkable   Patient admitted for further evaluation and management  Amount and/or Complexity of Data Reviewed  Clinical lab tests: ordered and reviewed  Tests in the radiology section of CPT®: ordered and reviewed  Independent visualization of images, tracings, or specimens: yes    Patient Progress  Patient progress: stable    CritCare Time    Disposition  Final diagnoses:   Right sided weakness   Upper gastrointestinal bleed   Nausea and vomiting   Abdominal pain     Time reflects when diagnosis was documented in both MDM as applicable and the Disposition within this note     Time User Action Codes Description Comment    12/14/2018  8:57 PM Ary Fothergill Add [R53 1] Right sided weakness     12/14/2018 11:38 PM Ary Fothergill Add [K92 2] Upper gastrointestinal bleed     12/14/2018 11:38 PM Ary Fothergill Add [R11 2] Nausea and vomiting     12/14/2018 11:38 PM Cesario POWELL Add [R10 9] Abdominal pain     12/15/2018  1:05 AM SalujaOumarAj Modify [R53 1] Right sided weakness     12/15/2018  1:05 AM SalOumar brunereep Modify [K92 2] Upper gastrointestinal bleed     12/15/2018  1:05 AM SaluOumar brunoeep Modify [R11 2] Nausea and vomiting       ED Disposition     ED Disposition Condition Comment    Admit  Case was discussed with GERTRUDE and the patient's admission status was agreed to be Admission Status: inpatient status to the service of Dr Mariano Urrutia   Follow-up Information    None         Current Discharge Medication List      CONTINUE these medications which have NOT CHANGED    Details   tamoxifen (NOLVADEX) 10 mg tablet Take 10 mg by mouth daily             No discharge procedures on file      ED Provider  Electronically Signed by           Luiz Valdes MD  12/15/18 0959       Luiz Valdes MD  12/15/18 7174

## 2018-12-15 NOTE — PROGRESS NOTES
Patients right grasp significantly weaker than the left  Patient stated this was her baseline d/t an injury that occurred in the summer of 2018

## 2018-12-15 NOTE — UTILIZATION REVIEW
Initial Clinical Review    Admission: Date/Time/Statement: 12/14/18 @ 2339     Orders Placed This Encounter   Procedures    Inpatient Admission (expected length of stay for this patient is greater than two midnights)     Standing Status:   Standing     Number of Occurrences:   1     Order Specific Question:   Admitting Physician     Answer:   Óscar Souza [[id number]]     Order Specific Question:   Level of Care     Answer:   Med Surg [16]     Order Specific Question:   Estimated length of stay     Answer:   More than 2 Midnights     Order Specific Question:   Certification     Answer:   I certify that inpatient services are medically necessary for this patient for a duration of greater than two midnights  See H&P and MD Progress Notes for additional information about the patient's course of treatment  ED: Date/Time/Mode of Arrival:   ED Arrival Information     Expected Arrival Acuity Means of Arrival Escorted By Service Admission Type    - 12/14/2018 20:56 Urgent Ambulance [id number] [facility] General Medicine Urgent    [address]          Chief Complaint:   Chief Complaint   Patient presents with    Vomiting     Pt c/o of nuaea and vomiting and generlized pain  History of Illness: Nico Christensen is a 47 y o  female who presented to the emergency department with complain of nausea, intractable vomiting and weakness  Patient states that she started having nausea associated with multiple episodes of vomiting since morning around 4:30 a m  On December 14th  She also had associated diarrhea  She states her vomiting stopped in afternoon but then she has been having constant dry heaves/retching  She developed abdominal pain which is generalized 5/10 intensity, sharp, constant, nonradiating  She complained of generalized weakness stand EMS was called  Patient was noticed to have right-sided weakness  No slurred speech, facial droop, diplopia, visual disturbance    In ER patient was found to have right-sided weakness and stroke alert was initiated  Patient underwent CTA head and neck neurology was consulted  Patient also had CT abdomen pelvis done in ER which was unremarkable  Patient states that she recently moved here to the area from [place]        ED Vital Signs:   ED Triage Vitals   Temperature Pulse Respirations Blood Pressure SpO2   12/14/18 2100 12/14/18 2100 12/14/18 2100 12/14/18 2100 12/14/18 2100   97 7 °F (36 5 °C) 66 19 163/82 95 %      Temp Source Heart Rate Source Patient Position - Orthostatic VS BP Location FiO2 (%)   12/14/18 2100 12/14/18 2100 12/14/18 2100 12/15/18 0046 --   Tympanic Monitor Lying Right arm       Pain Score       12/15/18 0046       2        Wt Readings from Last 1 Encounters:   12/15/18 91 kg (200 lb 9 9 oz)       Vital Signs (abnormal):    above    Abnormal Labs/Diagnostic Test Results:    UDS  +  THC  AG  16  WBC   16 87  Platelets  705  CXR:  NAD  CT abd/pelvis:      No acute  Findings  CTA   Head/neck:   No acute  Intracranial  hemorrhage    ED Treatment:   Medication Administration from 12/14/2018 2056 to 12/15/2018 0043       Date/Time Order Dose Route Action Action by Comments     12/14/2018 2139 iohexol (OMNIPAQUE) 350 MG/ML injection (MULTI-DOSE) 85 mL 85 mL Intravenous Given Anthony Foreman      12/14/2018 2157 ondansetron (ZOFRAN) injection 4 mg 4 mg Intravenous Given Wiley Fowler RN      12/14/2018 2305 metoclopramide (REGLAN) injection 10 mg 10 mg Intravenous Given Wiley Fowler RN           Past Medical/Surgical History:    Active Ambulatory Problems     Diagnosis Date Noted    No Active Ambulatory Problems     Resolved Ambulatory Problems     Diagnosis Date Noted    No Resolved Ambulatory Problems     Past Medical History:   Diagnosis Date    Cholecystolithiasis        Admitting Diagnosis: Abdominal pain [R10 9]  Nausea and vomiting [R11 2]  Upper gastrointestinal bleed [K92 2]  CVA (cerebral vascular accident) [facility]) [I63 9]  Right sided weakness [R53 1]    Age/Sex: 47 y o  female    Assessment/Plan: Admit to med surgical floor on telemetry as inpatient status  · Right-sided weakness rule out CVA  Neuro checks  Stroke pathway  Echocardiogram, MRI brain  Neurology consult  Hold aspirin secondary to upper GI bleed  HB A1c, lipid profile  Start on Lipitor  Telemetry monitoring  · Nausea, vomiting and diarrhea-possible gastroenteritis likely viral   · Upper GI bleed likely secondary to intractable vomiting/retching? Anahi-Park  Will start on Protonix 40 mg IV twice daily  IV fluids  GI consult  Antiemetics  Monitor H&H q 6 hours and transfuse as needed  Avoid antiplatelet and anticoagulation secondary to GI bleed  · Smoking cessation counseling given  On nicotine patch  · DVT prophylaxis with SCD  · Discussed with patient in detail  Anticipated length of stay greater than 2 midnights  Admission Orders:   IP    12/14  @  0610  Scheduled Meds:   Current Facility-Administered Medications:  acetaminophen 650 mg Oral Q4H PRN Allison Baker MD   atorvastatin 40 mg Oral QPM Allison Baker MD   influenza vaccine 0 5 mL Intramuscular Prior to discharge Allison Baker MD   nicotine 1 patch Transdermal Daily Allison Baker MD   ondansetron 4 mg Intravenous Q6H PRN Allison Baker MD   pantoprazole 40 mg Intravenous Q12H Dm Guevara MD   tamoxifen 10 mg Oral Daily Allison Baker MD     Continuous Infusions:    PRN Meds:   acetaminophen    influenza vaccine    ondansetron     Tele  Stroke  Teaching  Dysphagia  eval  PT/OT/speech eval  2 DE  MRI  Brain  H/H   Q  6 hrs  Dysphagia  eval  Neuro  Checks   Q 1 hr X 4,  Q 2 hrs  X 4   Then  Q  4  Hrs  X  72      Per  Neuro  Consult:  Patient presented with chief complaint of intractable nausea vomiting, with abdominal pain, was incidentally discovered to have some right-sided weakness    Her motor testing though is contaminated by give-way quality and impersistent effort with testing  There is no drift, incoordination, or reported sensory asymmetry  I think it is quite unlikely that this represents a stroke, or any other CNS structural lesion, but with prior history of breast CA additional potential concerns might include metastasis  Her concurrent vomiting might also invoke concerns for posterior fossa event, but that is also less likely in the absence of other brainstem or cerebellar findings  It seems more probable to me that this Lattie Crescent may represent conversion symptoms      She does have significant leukocytosis, without documented fever, unclear etiology        Plan:  MRI of the brain has been requested as part of her stroke workup  It is not unreasonable to pursue that in order to better exclude any ischemic, mass, or inflammatory lesion undetected on CT  Further investigation of her abdominal complaints, leukocytosis, as per primary team, GI consult  If her MRI is completed, and does not show any structural lesion, then I would remove her from stroke pathway, without need for additional testing in that regard  She has evidently refused telemetry  Given low suspicion for CVA, I think we can do without that, until/unless MRI is positive  Aspirin has been withheld due to Hemoccult positive emesis  Again given a relatively lower suspicion for stroke, I would not change that decision    [address] [facility] Review Department  Phone: [phone]; Fax [phone]  [email or web address]  ATTENTION: Please call with any questions or concerns to [phone]  and carefully listen to the prompts so that you are directed to the right person  Send all requests for admission clinical reviews, approved or denied determinations and any other requests to fax [phone]   All voicemails are confidential

## 2018-12-15 NOTE — PROGRESS NOTES
Patient adamantly refusing to wear telemetry monitor  Nursing supervisor notified    Educated patient on importance of telemetry and patient responded "I'm not here because of my heart "

## 2018-12-15 NOTE — SPEECH THERAPY NOTE
Speech Language/Pathology  Pt adm w/ nausea, vomiting, abd pain & some R sided weakness  Pt w/o facial weakness or deficits w/ speech  Tolerating diet & passed nsg screen for dysphagia  No formal eval at this time  Please reconsult as needed

## 2018-12-15 NOTE — CONSULTS
Consultation - GI   Socorro Ryan 47 y o  female MRN: [id number]  Unit/Bed#: [address] [place] [address] Encounter: [id number]    Inpatient consult to gastroenterology  Consult performed by: Shalonda Comment ordered by: Rizwana Campos        ASSESSMENT  Nausea vomiting and hematemesis - 67-year-old female without any significant GI history who presents with acute onset yesterday in of nausea vomiting with streaks of blood  This is possibly associated with some neurologic symptoms  Blood-streaked emesis is most likely due to Anahi-Park tear in the setting of nausea and retching  Esophagitis, peptic ulcer disease possible but less likely  Advised empiric PPI and antiemetics  Neurologic workup in progress  Consider EGD when stable from a neurovascular prospective  PLAN  IV PPI  IV antiemetics as needed  Follow H&H and observe for any further bleeding  Neurologic workup as planned to exclude CVA  EGD when clinically stable from a neurovascular prospective    Chief Complaint   Patient presents with    Vomiting     Pt c/o of nuaea and vomiting and generlized pain  HPI 67-year-old female without any significant GI history presented with 1 day of nausea vomiting and hematemesis  Also has some associated diarrhea but no melena or hematochezia  Diarrhea has resolved since coming to hospital   Patient continues to be nauseous with less vomiting and no hematemesis  Denies any focal abdominal pain but just feels tender in the abdomen  Denies aspirin or NSAIDs  Denies alcohol  No drug use  No ill contacts  Denies vertigo  Acknowledges some right-sided numbness and feeling generally weak      Past Medical History:   Diagnosis Date    Cholecystolithiasis        Past Surgical History:   Procedure Laterality Date    BREAST LUMPECTOMY Right     CHOLECYSTECTOMY      HYSTERECTOMY      KNEE SURGERY      TONSILLECTOMY         Prescriptions Prior to Admission   Medication    tamoxifen (NOLVADEX) 10 mg tablet       No Known Allergies    Social History     Family History   Problem Relation Age of Onset    Cancer Mother     Heart disease Father        Review of Systems - Negative except as per HPI nausea, anorexia, abdominal discomfort  Denies cardiac or pulmonary or genitourinary symptoms  Does have some arthralgias  Reports headaches  As per HPI new onset weakness and numbness  History of mood disorder  /85 (BP Location: Left arm)   Pulse 80   Temp 98 7 °F (37 1 °C) (Oral)   Resp 20   Ht 5' 2" (1 575 m)   Wt 91 kg (200 lb 9 9 oz)   SpO2 100%   BMI 36 69 kg/m²     PHYSICALEXAM  Patient is awake alert and oriented  HEENT is anicteric and conjunctiva pink  Lungs are clear  Heart regular rhythm  The abdomen is soft and protuberant with normoactive bowel sounds  There is mild diffuse tenderness without rebound or guarding  No mass organomegaly are appreciated  Extremities are without edema      Lab Results   Component Value Date    CALCIUM 9 1 12/15/2018    K 3 9 12/15/2018    CO2 22 12/15/2018     12/15/2018    BUN 11 12/15/2018    CREATININE 0 92 12/15/2018     Lab Results   Component Value Date    WBC 19 35 (H) 12/15/2018    HGB 14 5 12/15/2018    HCT 44 4 12/15/2018    MCV 88 12/15/2018     12/15/2018     Lab Results   Component Value Date    ALT 24 12/15/2018    AST 22 12/15/2018    ALKPHOS 62 12/15/2018     No components found for: AMYLJKJJJASE  Lab Results   Component Value Date    LIPASE 64 (L) 12/14/2018     No results found for: IRON, TIBC, FERRITIN  Lab Results   Component Value Date    INR 1 00 12/14/2018

## 2018-12-15 NOTE — CONSULTS
Consultation - Neurology   Socorro Brown [facility]  47 y o  female MRN: [id number]  Unit/Bed#: [address] [place] [id number] Encounter: [id number]      Assessment/Plan   Assessment:  Patient presented with chief complaint of intractable nausea vomiting, with abdominal pain, was incidentally discovered to have some right-sided weakness  Her motor testing though is contaminated by give-way quality and impersistent effort with testing  There is no drift, incoordination, or reported sensory asymmetry  I think it is quite unlikely that this represents a stroke, or any other CNS structural lesion, but with prior history of breast CA additional potential concerns might include metastasis  Her concurrent vomiting might also invoke concerns for posterior fossa event, but that is also less likely in the absence of other brainstem or cerebellar findings  It seems more probable to me that this Amie Colace may represent conversion symptoms  She does have significant leukocytosis, without documented fever, unclear etiology      Plan:  MRI of the brain has been requested as part of her stroke workup  It is not unreasonable to pursue that in order to better exclude any ischemic, mass, or inflammatory lesion undetected on CT  Further investigation of her abdominal complaints, leukocytosis, as per primary team, GI consult  If her MRI is completed, and does not show any structural lesion, then I would remove her from stroke pathway, without need for additional testing in that regard  She has evidently refused telemetry  Given low suspicion for CVA, I think we can do without that, until/unless MRI is positive  Aspirin has been withheld due to Hemoccult positive emesis    Again given a relatively lower suspicion for stroke, I would not change that decision    Physician Requesting Consult: Remington Sandoval MD  Reason for Consult / Principal Problem:  Right-sided weakness    HPI: Tanisha Pedraza is a 47y o  year old female who presents with chief complaint of intractable nausea and vomiting  She states this began in the early morning hours yesterday with multiple episodes of retching and vomiting  She did report at least subjective attendant fever, but denies any sick contacts  Evidently, EMS recognized some right-sided weakness without facial droop, which was also noted by ED physician, therefore stroke alert was activated  Neurology on-call advised against tPA given unknown time of onset, as well as ED physician's assessment of more give-way quality  Her neuro imaging studies (CT/CTA were unremarkable)  Patient reports she had some associated numbness around her nose, right hand, right leg which are now resolved  Denies any visual change, change in speech  She does admit to some unsteady gait that she attributes to her multiple episodes of vomiting  She denies any prior history of stroke, TIA, seizure  She does have prior history of lumpectomy, currently on tamoxifen    Inpatient consult to Neurology  Consult performed by: Gerber Liang ordered by: Kiara Ramos          Review of Systems   Constitutional: Positive for activity change, appetite change, fatigue and fever  HENT: Negative  Eyes: Negative  Respiratory: Positive for cough  Cardiovascular: Negative  Gastrointestinal: Positive for abdominal pain, constipation, diarrhea, nausea and vomiting  Endocrine: Negative  Genitourinary: Negative  Musculoskeletal: Positive for arthralgias  Skin: Negative  Allergic/Immunologic: Negative  Neurological: Positive for weakness, numbness and headaches  Negative for tremors, seizures, syncope and speech difficulty  Hematological: Negative  Psychiatric/Behavioral: Positive for dysphoric mood         Historical Information   Past Medical History:   Diagnosis Date    Cholecystolithiasis      Past Surgical History:   Procedure Laterality Date    BREAST LUMPECTOMY Right     CHOLECYSTECTOMY      HYSTERECTOMY      KNEE SURGERY      TONSILLECTOMY       Social History   History   Alcohol Use No     History   Drug Use    Types: Marijuana     Comment: "very rare", smoked a few days ago to calm stomach     History   Smoking Status    Current Every Day Smoker    Packs/day: 1 00    Years: 40 00   Smokeless Tobacco    Never Used     Family History: non-contributory    Review of previous medical records was  completed  No prior or outside records available    Meds/Allergies   all current active meds have been reviewed, current meds:   Current Facility-Administered Medications   Medication Dose Route Frequency    acetaminophen (TYLENOL) tablet 650 mg  650 mg Oral Q4H PRN    atorvastatin (LIPITOR) tablet 40 mg  40 mg Oral QPM    influenza vaccine, recombinant, quadrivalent (FLUBLOK) IM injection 0 5 mL  0 5 mL Intramuscular Prior to discharge    nicotine (NICODERM CQ) 21 mg/24 hr TD 24 hr patch 1 patch  1 patch Transdermal Daily    ondansetron (ZOFRAN) injection 4 mg  4 mg Intravenous Q6H PRN    pantoprazole (PROTONIX) injection 40 mg  40 mg Intravenous Q12H [facility]    tamoxifen (NOLVADEX) tablet 10 mg  10 mg Oral Daily    and PTA meds:   Prior to Admission Medications   Prescriptions Last Dose Informant Patient Reported? Taking?   tamoxifen (NOLVADEX) 10 mg tablet   Yes Yes   Sig: Take 10 mg by mouth daily        Facility-Administered Medications: None       No Known Allergies    Objective   Vitals:Blood pressure 140/76, pulse 75, temperature 98 8 °F (37 1 °C), temperature source Oral, resp  rate 18, height 5' 2" (1 575 m), weight 91 kg (200 lb 9 9 oz), SpO2 100 %  ,Body mass index is 36 69 kg/m²  No intake or output data in the 24 hours ending 12/15/18 0838    Invasive Devices: Invasive Devices     Peripheral Intravenous Line            Peripheral IV 12/14/18 Right Antecubital less than 1 day                Physical Exam   Constitutional: She appears well-developed and well-nourished  No distress  HENT:   Head: Normocephalic and atraumatic  Mouth/Throat: No oropharyngeal exudate  Eyes: Pupils are equal, round, and reactive to light  Conjunctivae and EOM are normal  No scleral icterus  Neck: Normal range of motion  Neck supple  No thyromegaly present  Cardiovascular: Normal rate and regular rhythm  Exam reveals no gallop and no friction rub  No murmur heard  Pulmonary/Chest: Effort normal and breath sounds normal  No respiratory distress  Abdominal: Soft  She exhibits no distension  There is tenderness  There is no guarding  Musculoskeletal: Normal range of motion  She exhibits no edema  Neurological: She has a normal Finger-Nose-Finger Test and a normal Heel to [employer]    Reflex Scores:       Tricep reflexes are 2+ on the right side and 2+ on the left side  Bicep reflexes are 2+ on the right side and 2+ on the left side  Brachioradialis reflexes are 2+ on the right side and 2+ on the left side  Patellar reflexes are 2+ on the right side and 2+ on the left side  Achilles reflexes are 2+ on the right side and 2+ on the left side  Skin: Skin is warm and dry  No rash noted  She is not diaphoretic  No erythema  Psychiatric: Her speech is normal    Vitals reviewed  Neurologic Exam     Mental Status   Oriented to person  Oriented to place  Disoriented to time  Disoriented to date  Oriented to year, month, day and season  Follows 2 step commands  Attention: normal  Concentration: normal    Speech: speech is normal   Level of consciousness: alert  Able to name object  Able to read  Able to repeat  Normal comprehension  Cranial Nerves     CN II   Visual fields full to confrontation  CN III, IV, VI   Pupils are equal, round, and reactive to light  Extraocular motions are normal    Nystagmus: none     CN V   Facial sensation intact  CN VII   Facial expression full, symmetric       CN VIII   CN VIII normal      CN IX, X   CN IX normal    CN X normal  CN XI   Left sternocleidomastoid strength: weak (Giveaway weakness in maintaining rightward head turn )    CN XII   CN XII normal      Motor Exam   Overall muscle tone: normal  Right arm pronator drift: absent  Left arm pronator drift: absent    Strength   Strength 5/5 except as noted  Poor effort and give-way weakness right-sided limbs, normal on the left  Reports some pain inhibition at right wrist and knee from prior injuries     Sensory Exam   Light touch normal    Vibration normal    Pinprick normal      Gait, Coordination, and Reflexes     Coordination   Finger to nose coordination: normal  Heel to shin coordination: normal    Reflexes   Right brachioradialis: 2+  Left brachioradialis: 2+  Right biceps: 2+  Left biceps: 2+  Right triceps: 2+  Left triceps: 2+  Right patellar: 2+  Left patellar: 2+  Right achilles: 2+  Left achilles: 2+  Right plantar: normal  Left plantar: normal      Lab Results:   I have personally reviewed pertinent reports    , CBC:   Results from last 7 days  Lab Units 12/15/18  0540 12/14/18  2202   WBC Thousand/uL 19 35* 16 87*   RBC Million/uL 4 97 5 36*   HEMOGLOBIN g/dL 14 3 15 3   HEMATOCRIT % 43 8 46 7*   MCV fL 88 87   PLATELETS Thousands/uL 368 415*   , BMP/CMP:   Results from last 7 days  Lab Units 12/15/18  0540 12/14/18  2202   SODIUM mmol/L 139 139   POTASSIUM mmol/L 3 9 4 3   CHLORIDE mmol/L 103 102   CO2 mmol/L 22 21   BUN mg/dL 11 13   CREATININE mg/dL 0 92 1 27   CALCIUM mg/dL 9 1 9 6   AST U/L 22 14   ALT U/L 24 26   ALK PHOS U/L 62 72   EGFR ml/min/1 73sq m 72 49   , HgBA1C:   , Coagulation:   Results from last 7 days  Lab Units 12/14/18  2202   INR  1 00   , Urinalysis:   Results from last 7 days  Lab Units 12/14/18  2309   COLOR UA  yello   CLARITY UA  clear   SPEC GRAV US  1 010   PH UA  6 2   LEUKOCYTES UA  neg   NITRITE UA  neg   GLUCOSE UA  neg   KETONES UA  trace   BILIRUBIN, UA  neg   BLOOD UA  tarce     Imaging Studies: I have personally reviewed pertinent films in PACS  CT head and CTA head and neck both unremarkable  EKG, Pathology, and Other Studies: I have personally reviewed pertinent reports          Code Status: Level 3 - DNAR and DNI

## 2018-12-15 NOTE — H&P
History and Physical  Socorro Ryan 47 y o  female MRN: [id number]  Unit/Bed#: [address] [place] [address] Encounter: [id number]    Admitting Diagnosis:   Principal Problem:    Right sided weakness  Active Problems:    Nausea and vomiting    Upper gastrointestinal bleed  Resolved Problems:    * No resolved hospital problems  *      Plan:  Admit to med surgical floor on telemetry as inpatient status  · Right-sided weakness rule out CVA  Neuro checks  Stroke pathway  Echocardiogram, MRI brain  Neurology consult  Hold aspirin secondary to upper GI bleed  HB A1c, lipid profile  Start on Lipitor  Telemetry monitoring  · Nausea, vomiting and diarrhea-possible gastroenteritis likely viral   · Upper GI bleed likely secondary to intractable vomiting/retching? Anahi-Park  Will start on Protonix 40 mg IV twice daily  IV fluids  GI consult  Antiemetics  Monitor H&H q 6 hours and transfuse as needed  Avoid antiplatelet and anticoagulation secondary to GI bleed  · Smoking cessation counseling given  On nicotine patch  · DVT prophylaxis with SCD  · Discussed with patient in detail  Anticipated length of stay greater than 2 midnights  Chief Complaint   Patient presents with    Vomiting     Pt c/o of nuaea and vomiting and generlized pain  HPI:  Nico Christensen is a 47 y o  female who presented to the emergency department with complain of nausea, intractable vomiting and weakness  Patient states that she started having nausea associated with multiple episodes of vomiting since morning around 4:30 a m  On December 14th  She also had associated diarrhea  She states her vomiting stopped in afternoon but then she has been having constant dry heaves/retching  She developed abdominal pain which is generalized 5/10 intensity, sharp, constant, nonradiating  She complained of generalized weakness stand EMS was called  Patient was noticed to have right-sided weakness    No slurred speech, facial droop, diplopia, visual disturbance  In ER patient was found to have right-sided weakness and stroke alert was initiated  Patient underwent CTA head and neck neurology was consulted  Patient also had CT abdomen pelvis done in ER which was unremarkable  Patient states that she recently moved here to the area from [address] [facility]  Historical Information   Past Medical History:   Diagnosis Date    Cholecystolithiasis      Past Surgical History:   Procedure Laterality Date    BREAST LUMPECTOMY Right     CHOLECYSTECTOMY      HYSTERECTOMY      KNEE SURGERY      TONSILLECTOMY       Social History   History   Alcohol Use No     History   Drug Use    Types: Marijuana     Comment: "very rare", smoked a few days ago to calm stomach     History   Smoking Status    Current Every Day Smoker    Packs/day: 1 00    Years: 40 00   Smokeless Tobacco    Never Used     Family History   Problem Relation Age of Onset    Cancer Mother     Heart disease Father        Meds/Allergies   No Known Allergies    Meds:  No current facility-administered medications for this encounter  Prescriptions Prior to Admission   Medication    tamoxifen (NOLVADEX) 10 mg tablet         Review of Systems   Constitutional: Positive for activity change and fatigue  HENT: Negative  Eyes: Negative  Respiratory: Negative  Cardiovascular: Negative  Gastrointestinal: Positive for abdominal pain, nausea and vomiting  Endocrine: Negative  Genitourinary: Negative  Musculoskeletal: Negative  Skin: Negative  Allergic/Immunologic: Negative  Neurological: Positive for weakness  Right sided weakness   Hematological: Negative  Psychiatric/Behavioral: Negative          Current Vitals:   Blood Pressure: 167/80 (12/15/18 0046)  Pulse: 67 (12/15/18 0046)  Temperature: 99 3 °F (37 4 °C) (12/15/18 0046)  Temp Source: Oral (12/15/18 0046)  Respirations: 20 (12/15/18 0046)  Height: 5' 2" (157 5 cm) (12/15/18 0046)  Weight - Scale: 91 kg (200 lb 9 9 oz) (12/15/18 0046)  SpO2: 100 % (12/15/18 0046)  SPO2 RA Rest      ED to Hosp-Admission (Current) from 12/14/2018 in [facility] Surg Unit   SpO2  100 %   SpO2 Activity     O2 Device  None (Room air)   O2 Flow Rate          No intake or output data in the 24 hours ending 12/15/18 0100  Body mass index is 36 69 kg/m²  Physical Exam   Constitutional: She is oriented to person, place, and time  She appears well-developed and well-nourished  No distress  HENT:   Head: Normocephalic and atraumatic  Nose: Nose normal    Mouth/Throat: Oropharynx is clear and moist    Eyes: Pupils are equal, round, and reactive to light  Conjunctivae and EOM are normal    Neck: Normal range of motion  Neck supple  No JVD present  No tracheal deviation present  Cardiovascular: Normal rate, regular rhythm, normal heart sounds and intact distal pulses  Pulmonary/Chest: Effort normal and breath sounds normal  No respiratory distress  She has no wheezes  She has no rales  Abdominal: Soft  Bowel sounds are normal  She exhibits no distension  There is tenderness  There is no rebound and no guarding  Musculoskeletal: Normal range of motion  She exhibits no edema, tenderness or deformity  Neurological: She is alert and oriented to person, place, and time  No cranial nerve deficit  Coordination normal    Right sided weakness  Motor strength in right upper and lower extremities 3/5  No sensory deficits  No slurred speech, facial droop, no visual disturbance   Skin: Skin is warm  No rash noted  No erythema  Psychiatric: She has a normal mood and affect  Thought content normal    Nursing note and vitals reviewed        Lab Results:   CBC:   Lab Results   Component Value Date    WBC 16 87 (H) 12/14/2018    HGB 15 3 12/14/2018    HCT 46 7 (H) 12/14/2018    MCV 87 12/14/2018     (H) 12/14/2018    MCH 28 5 12/14/2018    MCHC 32 8 12/14/2018    RDW 13 7 12/14/2018    MPV 9 6 12/14/2018 CMP:  Lab Results   Component Value Date     12/14/2018    CO2 21 12/14/2018    BUN 13 12/14/2018    CREATININE 1 27 12/14/2018    CALCIUM 9 6 12/14/2018    AST 14 12/14/2018    ALT 26 12/14/2018    ALKPHOS 72 12/14/2018    EGFR 49 12/14/2018     No results found for: TROPONINI, CKMB, CKTOTAL  Coagulation:   Lab Results   Component Value Date    INR 1 00 12/14/2018    Urinalysis:  Lab Results   Component Value Date    COLORU yello 12/14/2018    CLARITYU clear 12/14/2018    SPECGRAV 1 010 12/14/2018    PHUR 6 2 12/14/2018    LEUKOCYTESUR neg 12/14/2018    NITRITE neg 12/14/2018    GLUCOSEU neg 12/14/2018    KETONESU trace 12/14/2018    BILIRUBINUR neg 12/14/2018    BLOODU tarce 12/14/2018      Amylase: No results found for: AMYLASE  Lipase:   Lab Results   Component Value Date    LIPASE 64 (L) 12/14/2018        Imaging: Ct Abdomen Pelvis With Contrast    Result Date: 12/14/2018  Narrative: CT ABDOMEN AND PELVIS WITH IV CONTRAST INDICATION:   Unspecified abdominal pain, nausea, vomiting  COMPARISON:  None  TECHNIQUE:  CT examination of the abdomen and pelvis was performed  Axial, sagittal, and coronal 2D reformatted images were created from the source data and submitted for interpretation  Radiation dose length product (DLP) for this visit:  875 mGy-cm   This examination, like all CT scans performed in the [facility], was performed utilizing techniques to minimize radiation dose exposure, including the use of iterative reconstruction and automated exposure control  IV Contrast:  85 mL of iohexol (OMNIPAQUE) iohexol (OMNIPAQUE) 350 MG/ML injection (MULTI-DOSE) 85 mL Enteric Contrast:  Enteric contrast was not administered  FINDINGS: ABDOMEN LOWER CHEST:  No clinically significant abnormality identified in the visualized lower chest  LIVER/BILIARY TREE:  Unremarkable  GALLBLADDER:  Gallbladder is surgically absent  SPLEEN:  Unremarkable  PANCREAS:  Unremarkable  ADRENAL GLANDS:  Unremarkable  KIDNEYS/URETERS:  Unremarkable  No hydronephrosis  STOMACH AND BOWEL:  There is colonic diverticulosis without evidence of acute diverticulitis  APPENDIX:  No findings to suggest appendicitis  ABDOMINOPELVIC CAVITY:  No ascites or free intraperitoneal air  No lymphadenopathy  VESSELS:  Unremarkable for patient's age  PELVIS REPRODUCTIVE ORGANS:  Patient is status post hysterectomy  URINARY BLADDER:  Unremarkable  ABDOMINAL WALL/INGUINAL REGIONS:  Unremarkable  OSSEOUS STRUCTURES:  No acute fracture or destructive osseous lesion  Impression: No acute CT findings  Scattered colonic diverticula  Workstation performed: [id number]     Cta Stroke Alert (head/neck)    Result Date: 12/14/2018  Narrative: CTA NECK AND BRAIN WITH AND WITHOUT CONTRAST INDICATION: right weakness , right-sided numbness COMPARISON:   None  TECHNIQUE:  Routine CT imaging of the Brain without contrast   Post contrast imaging was performed after administration of iodinated contrast through the neck and brain  Post contrast axial 0 625 mm images timed to opacify the arterial system  3D rendering was performed on an independent workstation  MIP reconstructions performed  Coronal reconstructions were performed of the noncontrast portion of the brain  Radiation dose length product (DLP) for this visit:  1436 mGy-cm   This examination, like all CT scans performed in the [facility], was performed utilizing techniques to minimize radiation dose exposure, including the use of iterative reconstruction and automated exposure control  IV Contrast: iohexol (OMNIPAQUE) 350 MG/ML injection (MULTI-DOSE) 85 mL  IMAGE QUALITY:   Diagnostic FINDINGS: NONCONTRAST BRAIN PARENCHYMA:  No intracranial mass, mass effect or midline shift  No acute intracranial hemorrhage  No CT signs of acute infarction  Large pituitary fossa, follow-up nonemergent outpatient contrast enhanced brain MRI   VENTRICLES AND EXTRA-AXIAL SPACES:  Normal for patient's age  Zara Dayhoff SINUSES:  Unremarkable  CERVICAL VASCULATURE AORTIC ARCH AND GREAT VESSELS:  Normal aortic arch and great vessel origins  Normal visualized subclavian vessels  RIGHT VERTEBRAL ARTERY CERVICAL SEGMENT:  Normal origin  The vessel is normal in caliber throughout the neck  LEFT VERTEBRAL ARTERY CERVICAL SEGMENT:  Normal origin  The vessel is normal in caliber throughout the neck  RIGHT EXTRACRANIAL CAROTID SEGMENT:  Normal caliber common carotid artery  Normal bifurcation and cervical internal carotid artery  No stenosis or dissection  LEFT EXTRACRANIAL CAROTID SEGMENT:  Normal caliber common carotid artery  Normal bifurcation and cervical internal carotid artery  No stenosis or dissection  NASCET criteria was used to determine the degree of internal carotid artery diameter stenosis  INTRACRANIAL VASCULATURE INTERNAL CAROTID ARTERIES:  Normal enhancement of the intracranial portions of the internal carotid arteries  Normal ophthalmic artery origins  Normal ICA terminus  ANTERIOR CIRCULATION:  Symmetric A1 segments and anterior cerebral arteries with normal enhancement  Normal anterior communicating artery  MIDDLE CEREBRAL ARTERY CIRCULATION:  M1 segment and middle cerebral artery branches demonstrate normal enhancement bilaterally  DISTAL VERTEBRAL ARTERIES:  Normal distal vertebral arteries  Posterior inferior cerebellar artery origins are normal  Normal vertebral basilar junction  BASILAR ARTERY:  Basilar artery is normal in caliber  Normal superior cerebellar arteries  POSTERIOR CEREBRAL ARTERIES: Both posterior cerebral arteries arises from the basilar tip  Both arteries demonstrate normal enhancement  Normal posterior communicating arteries  DURAL VENOUS SINUSES:  Normal  NON VASCULAR ANATOMY BONY STRUCTURES:  No acute osseous abnormality  SOFT TISSUES OF THE NECK:  Unremarkable  THORACIC INLET:  Unremarkable       Impression: No evidence of acute intracranial hemorrhage  No evidence of hemodynamic significant stenosis, aneurysm or dissection  Large pituitary fossa, follow-up nonemergent outpatient contrast enhanced brain MRI  I personally discussed this study with Vannessa Grubbs on 12/14/2018 at 9:19PM  Workstation performed: [id number]     EKG, Pathology, and Other Studies: I have personally reviewed the results  VTE Pharmacologic Prophylaxis: Reason for no pharmacologic prophylaxis GI bleed  VTE Mechanical Prophylaxis: sequential compression device    Code Status: Level 3 - DNAR and DNI    Counseling / Coordination of Care  Total floor / unit time spent today 75 minutes  Greater than 50% of total time was spent with the patient and / or family counseling and / or coordination of care       "This note has been constructed using a voice recognition system"      Max Ndiaye MD  12/15/2018, 1:00 AM

## 2018-12-15 NOTE — QUICK NOTE
Stroke alert was called for patient today  Patient came in with right-sided weakness  It was suspected that patient has give-way weakness  Because stroke alert was called we decided to get CTA of the head and neck      CTA head and neck  IMPRESSION:   No evidence of acute intracranial hemorrhage    No evidence of hemodynamic significant stenosis, aneurysm or dissection    Large pituitary fossa, follow-up nonemergent outpatient contrast enhanced brain MRI

## 2018-12-15 NOTE — ED NOTES
Pt does not want blood pressure cuff on  Tried several times pt screams, gets angry , and tries to rip it off       Laura Sanchez, BRIDGET  12/15/18 2455

## 2018-12-16 LAB
ALBUMIN SERPL BCP-MCNC: 3.5 G/DL (ref 3.5–5)
ALP SERPL-CCNC: 64 U/L (ref 46–116)
ALT SERPL W P-5'-P-CCNC: 28 U/L (ref 12–78)
ANION GAP SERPL CALCULATED.3IONS-SCNC: 13 MMOL/L (ref 4–13)
AST SERPL W P-5'-P-CCNC: 20 U/L (ref 5–45)
BASOPHILS # BLD AUTO: 0.04 THOUSANDS/ΜL (ref 0–0.1)
BASOPHILS NFR BLD AUTO: 0 % (ref 0–1)
BILIRUB SERPL-MCNC: 0.6 MG/DL (ref 0.2–1)
BUN SERPL-MCNC: 9 MG/DL (ref 5–25)
CALCIUM SERPL-MCNC: 8.9 MG/DL (ref 8.3–10.1)
CHLORIDE SERPL-SCNC: 101 MMOL/L (ref 100–108)
CO2 SERPL-SCNC: 24 MMOL/L (ref 21–32)
CREAT SERPL-MCNC: 0.89 MG/DL (ref 0.6–1.3)
EOSINOPHIL # BLD AUTO: 0.03 THOUSAND/ΜL (ref 0–0.61)
EOSINOPHIL NFR BLD AUTO: 0 % (ref 0–6)
ERYTHROCYTE [DISTWIDTH] IN BLOOD BY AUTOMATED COUNT: 14.2 % (ref 11.6–15.1)
GFR SERPL CREATININE-BSD FRML MDRD: 75 ML/MIN/1.73SQ M
GLUCOSE SERPL-MCNC: 106 MG/DL (ref 65–140)
HCT VFR BLD AUTO: 41.8 % (ref 34.8–46.1)
HCT VFR BLD AUTO: 43.5 % (ref 34.8–46.1)
HGB BLD-MCNC: 14.1 G/DL (ref 11.5–15.4)
HGB BLD-MCNC: 14.2 G/DL (ref 11.5–15.4)
IMM GRANULOCYTES # BLD AUTO: 0.03 THOUSAND/UL (ref 0–0.2)
IMM GRANULOCYTES NFR BLD AUTO: 0 % (ref 0–2)
LYMPHOCYTES # BLD AUTO: 2.95 THOUSANDS/ΜL (ref 0.6–4.47)
LYMPHOCYTES NFR BLD AUTO: 24 % (ref 14–44)
MCH RBC QN AUTO: 29.3 PG (ref 26.8–34.3)
MCHC RBC AUTO-ENTMCNC: 33.7 G/DL (ref 31.4–37.4)
MCV RBC AUTO: 87 FL (ref 82–98)
MONOCYTES # BLD AUTO: 0.98 THOUSAND/ΜL (ref 0.17–1.22)
MONOCYTES NFR BLD AUTO: 8 % (ref 4–12)
NEUTROPHILS # BLD AUTO: 8.4 THOUSANDS/ΜL (ref 1.85–7.62)
NEUTS SEG NFR BLD AUTO: 68 % (ref 43–75)
PLATELET # BLD AUTO: 320 THOUSANDS/UL (ref 149–390)
PMV BLD AUTO: 9.9 FL (ref 8.9–12.7)
POTASSIUM SERPL-SCNC: 3.5 MMOL/L (ref 3.5–5.3)
PROT SERPL-MCNC: 7.5 G/DL (ref 6.4–8.2)
RBC # BLD AUTO: 4.81 MILLION/UL (ref 3.81–5.12)
SODIUM SERPL-SCNC: 138 MMOL/L (ref 136–145)
WBC # BLD AUTO: 12.43 THOUSAND/UL (ref 4.31–10.16)

## 2018-12-16 PROCEDURE — 99232 SBSQ HOSP IP/OBS MODERATE 35: CPT | Performed by: INTERNAL MEDICINE

## 2018-12-16 PROCEDURE — 80053 COMPREHEN METABOLIC PANEL: CPT | Performed by: INTERNAL MEDICINE

## 2018-12-16 PROCEDURE — C9113 INJ PANTOPRAZOLE SODIUM, VIA: HCPCS | Performed by: INTERNAL MEDICINE

## 2018-12-16 PROCEDURE — 85014 HEMATOCRIT: CPT | Performed by: INTERNAL MEDICINE

## 2018-12-16 PROCEDURE — 85018 HEMOGLOBIN: CPT | Performed by: INTERNAL MEDICINE

## 2018-12-16 PROCEDURE — 85025 COMPLETE CBC W/AUTO DIFF WBC: CPT | Performed by: INTERNAL MEDICINE

## 2018-12-16 RX ORDER — POTASSIUM CHLORIDE 20 MEQ/1
40 TABLET, EXTENDED RELEASE ORAL ONCE
Status: COMPLETED | OUTPATIENT
Start: 2018-12-16 | End: 2018-12-16

## 2018-12-16 RX ADMIN — PANTOPRAZOLE SODIUM 40 MG: 40 INJECTION, POWDER, FOR SOLUTION INTRAVENOUS at 09:11

## 2018-12-16 RX ADMIN — POTASSIUM CHLORIDE 40 MEQ: 1500 TABLET, EXTENDED RELEASE ORAL at 16:55

## 2018-12-16 RX ADMIN — PANTOPRAZOLE SODIUM 40 MG: 40 INJECTION, POWDER, FOR SOLUTION INTRAVENOUS at 21:05

## 2018-12-16 RX ADMIN — TAMOXIFEN CITRATE 10 MG: 10 TABLET ORAL at 09:11

## 2018-12-16 NOTE — PROGRESS NOTES
Progress Note - Socorro Ryan 47 y o  female MRN: [id number]  Unit/Bed#: [address] [place] [address] Encounter: [id number]    Assessment:  Principal Problem:    Right sided weakness  Active Problems:    Nausea and vomiting    Upper gastrointestinal bleed  Resolved Problems:    * No resolved hospital problems  *      Plan:  · Right-sided weakness  MRI showed no acute infarction or hemorrhage  Will discontinue stroke pathway  Neurology consult appreciated  Continue to hold aspirin secondary to GI bleed  On Lipitor  Patient refusing telemetry monitoring  · Nausea vomiting and hematemesis  GI consult appreciated  IV Protonix and antiemetics  NPO after midnight  Patient is scheduled for EGD in a m  Monitor H&H and transfuse as need  Avoid antiplatelet and anticoagulation secondary to GI bleed  · Smoking cessation counseling given  On nicotine patch  · DVT prophylaxis with SCD  · Discussed with patient in detail  Subjective:   Patient is seen and examined at bedside  She is still having dry heaving, abdominal pain  Denies any new complaints  Afebrile  Right-sided weakness is almost resolved  All other ROS are negative  Objective:   Vitals: Blood pressure 148/93, pulse 88, temperature 98 5 °F (36 9 °C), temperature source Oral, resp  rate 20, height 5' 2" (1 575 m), weight 88 9 kg (195 lb 15 8 oz), SpO2 98 %  ,Body mass index is 35 85 kg/m²  SPO2 RA Rest      ED to Hosp-Admission (Current) from 12/14/2018 in [facility] Surg Unit   SpO2  98 %   SpO2 Activity     O2 Device  None (Room air)   O2 Flow Rate          I&O: No intake or output data in the 24 hours ending 12/16/18 1402    Physical Exam:    General- Alert, lying comfortably in bed  Not in any acute distress  HEENT- LAURIE, EOM intact  Neck- Supple, No JVD  CVS- regular, S1 and S2 normal   Chest- Bilateral Air entry, No rhochi, crackles or wheezing present    Abdomen- soft, nontender, not distended, no guarding or rigidity, BS+  Extremities-  No pedal edema, No calf tenderness  CNS-   Alert, awake and orientedx3  Right-sided motor strength 4/5    Invasive Devices     Peripheral Intravenous Line            Peripheral IV 12/14/18 Right Antecubital 1 day                      Social History  reviewed  Family History   Problem Relation Age of Onset    Cancer Mother     Heart disease Father     reviewed    Meds:  Current Facility-Administered Medications   Medication Dose Route Frequency Provider Last Rate Last Dose    acetaminophen (TYLENOL) tablet 650 mg  650 mg Oral Q4H PRN Flo Jacobsen MD        atorvastatin (LIPITOR) tablet 40 mg  40 mg Oral QPM Flo Jacobsen MD   40 mg at 12/15/18 1758    influenza vaccine, recombinant, quadrivalent (FLUBLOK) IM injection 0 5 mL  0 5 mL Intramuscular Prior to discharge Flo Jacobsen MD        nicotine (NICODERM CQ) 21 mg/24 hr TD 24 hr patch 1 patch  1 patch Transdermal Daily Flo Jacobsen MD        ondansetron (ZOFRAN) injection 4 mg  4 mg Intravenous Q6H PRN Flo Jacobsen MD   4 mg at 12/15/18 2337    pantoprazole (PROTONIX) injection 40 mg  40 mg Intravenous Q12H Delta Alejo MD   40 mg at 12/16/18 0911    tamoxifen (NOLVADEX) tablet 10 mg  10 mg Oral Daily Flo Jacobsen MD   10 mg at 12/16/18 0911      Prescriptions Prior to Admission   Medication    tamoxifen (NOLVADEX) 10 mg tablet       Labs:    Results from last 7 days  Lab Units 12/16/18  0546 12/16/18  0113 12/15/18  1832  12/15/18  0540 12/14/18  2202   WBC Thousand/uL 12 43*  --   --   --  19 35* 16 87*   HEMOGLOBIN g/dL 14 1 14 2 14 5  < > 14 3 15 3   HEMATOCRIT % 41 8 43 5 44 2  < > 43 8 46 7*   PLATELETS Thousands/uL 320  --   --   --  368 415*   NEUTROS PCT % 68  --   --   --  80*  --    LYMPHS PCT % 24  --   --   --  13*  --    MONOS PCT % 8  --   --   --  7  --    EOS PCT % 0  --   --   --  0  --    < > = values in this interval not displayed      Results from last 7 days  Lab Units 12/16/18  0546 12/15/18  0540 12/14/18  2202   POTASSIUM mmol/L 3 5 3 9 4 3   CHLORIDE mmol/L 101 103 102   CO2 mmol/L 24 22 21   BUN mg/dL 9 11 13   CREATININE mg/dL 0 89 0 92 1 27   CALCIUM mg/dL 8 9 9 1 9 6   ALK PHOS U/L 64 62 72   ALT U/L 28 24 26   AST U/L 20 22 14     No results found for: TROPONINI, CKMB, CKTOTAL    Results from last 7 days  Lab Units 12/14/18  2202   INR  1 00     No results found for: Channing Goltz, SPUTUMCULTUR      Imaging:  Results for orders placed during the hospital encounter of 12/14/18   X-ray chest 1 view portable    Narrative CHEST     INDICATION:   stroke  COMPARISON:  None    EXAM PERFORMED/VIEWS:  XR CHEST PORTABLE  portable AP semierect view  FINDINGS:    Cardiomediastinal silhouette appears unremarkable  The lungs are clear  No pneumothorax or pleural effusion  Osseous structures appear within normal limits for patient age  Impression No acute cardiopulmonary disease  Workstation performed: [id number]       No results found for this or any previous visit  Labs & Imaging: I have personally reviewed pertinent reports        VTE Pharmacologic Prophylaxis: Reason for no pharmacologic prophylaxis Hematemesis  VTE Mechanical Prophylaxis: sequential compression device    Code Status:   Level 3 - DNAR and DNI      "This note has been constructed using a voice recognition system"      Dany Smith MD  12/16/2018,2:02 PM

## 2018-12-16 NOTE — PROGRESS NOTES
Tatiana Blanca  [id number]    47 y o   female      ASSESSMENT  Nausea/ vomiting/hematemesis - continues to have nausea and retching  No further hematemesis  Taking some clears but not tolerating much food  Clinically stable with respect to GI bleeding  Apparently ruled out for acute CNS event  Esophagitis and peptic ulcer along with Anahi-Park tear still in the differential with respect to her GI issues  Will plan EGD    PLAN  Continue IV PPI  Anti medics as needed  Follow H&H  Will schedule for EGD    Chief Complaint   Patient presents with    Vomiting     Pt c/o of nuaea and vomiting and generlized pain  SUBJECTIVE/HPI   Still with nausea and now retching but no vomiting  No further hematemesis  No melena or hematochezia  States of abdomen diffusely with pain, no focal complaints  No diarrhea  Denies vertigo or lateralizing weakness or numbness  /93 (BP Location: Left arm)   Pulse 88   Temp 98 5 °F (36 9 °C) (Oral)   Resp 20   Ht 5' 2" (1 575 m)   Wt 88 9 kg (195 lb 15 8 oz)   SpO2 98%   BMI 35 85 kg/m²     PHYSICALEXAM   Lethargic but arousable  Oriented x3  Anxious  Lungs clear  Heart regular rhythm  Abdomen soft with normoactive bowel sounds  No apparent tenderness  No rebound      Lab Results   Component Value Date    CALCIUM 8 9 12/16/2018    K 3 5 12/16/2018    CO2 24 12/16/2018     12/16/2018    BUN 9 12/16/2018    CREATININE 0 89 12/16/2018     Lab Results   Component Value Date    WBC 12 43 (H) 12/16/2018    HGB 14 1 12/16/2018    HCT 41 8 12/16/2018    MCV 87 12/16/2018     12/16/2018     Lab Results   Component Value Date    ALT 28 12/16/2018    AST 20 12/16/2018    ALKPHOS 64 12/16/2018     No components found for: [id number]  Lab Results   Component Value Date    LIPASE 64 (L) 12/14/2018     No results found for: IRON, TIBC, FERRITIN  Lab Results   Component Value Date    INR 1 00 12/14/2018

## 2018-12-16 NOTE — PLAN OF CARE
Problem: Neurological Deficit  Goal: Neurological status is stable or improving  Interventions:  - Monitor and assess patient's level of consciousness, motor function, sensory function, and level of assistance needed for ADLs  - Monitor and report changes from baseline  Collaborate with interdisciplinary team to initiate plan and implement interventions as ordered  - Provide and maintain a safe environment  - Utilize seizure precautions  - Utilize fall precautions  - Utilize aspiration precautions  - Utilize bleeding precautions  Outcome: Progressing      Problem: Activity Intolerance/Impaired Mobility  Goal: Mobility/activity is maintained at optimum level for patient  Interventions:  - Assess and monitor patient  barriers to mobility and need for assistive/adaptive devices  - Assess patient's emotional response to limitations  - Collaborate with interdisciplinary team and initiate plans and interventions as ordered  - Encourage independent activity per ability   - Maintain proper body alignment  - Perform active/passive rom as tolerated/ordered  - Plan activities to conserve energy   - Turn patient   Outcome: Progressing      Problem: Communication Impairment  Goal: Ability to express needs and understand communication  Assess patient's communication skills and ability to understand information  Patient will demonstrate use of effective communication techniques, alternative methods of communication and understanding even if not able to speak  - Encourage communication and provide alternate methods of communication as needed  - Collaborate with case management/ for discharge needs  - Include patient/family/caregiver in decisions related to communication  Outcome: Progressing      Problem: Potential for Aspiration  Goal: Non-ventilated patient's risk of aspiration is minimized  Assess and monitor vital signs, respiratory status, and labs (WBC)    Monitor for signs of aspiration (tachypnea, cough, rales, wheezing, cyanosis, fever)  - Assess and monitor patient's ability to swallow  - Place patient up in chair to eat if possible  - HOB up at 90 degrees to eat if unable to get patient up into chair   - Supervise patient during oral intake  - Instruct patient to take small bites  - Instruct patient to take small single sips when taking liquids  - Follow patient-specific strategies generated by speech pathologist    Outcome: Progressing      Problem: Nutrition  Goal: Nutrition/Hydration status is improving  Monitor and assess patient's nutrition/hydration status for malnutrition (ex- brittle hair, bruises, dry skin, pale skin and conjunctiva, muscle wasting, smooth red tongue, and disorientation)  Collaborate with interdisciplinary team and initiate plan and interventions as ordered  Monitor patient's weight and dietary intake as ordered or per policy  Utilize nutrition screening tool and intervene per policy  Determine patient's food preferences and provide high-protein, high-caloric foods as appropriate  - Assist patient with eating   - Allow adequate time for meals   - Encourage patient to take dietary supplement as ordered  - Collaborate with clinical nutritionist   - Include patient/family/caregiver in decisions related to nutrition     Outcome: Progressing      Problem: PAIN - ADULT  Goal: Verbalizes/displays adequate comfort level or baseline comfort level  Interventions:  - Encourage patient to monitor pain and request assistance  - Assess pain using appropriate pain scale  - Administer analgesics based on type and severity of pain and evaluate response  - Implement non-pharmacological measures as appropriate and evaluate response  - Consider cultural and social influences on pain and pain management  - Notify physician/advanced practitioner if interventions unsuccessful or patient reports new pain   Outcome: Progressing      Problem: INFECTION - ADULT  Goal: Absence or prevention of progression during hospitalization  INTERVENTIONS:  - Assess and monitor for signs and symptoms of infection  - Monitor lab/diagnostic results  - Monitor all insertion sites, i e  indwelling lines, tubes, and drains  - Monitor endotracheal (as able) and nasal secretions for changes in amount and color  - [employer] appropriate cooling/warming therapies per order  - Administer medications as ordered  - Instruct and encourage patient and family to use good hand hygiene technique  - Identify and instruct in appropriate isolation precautions for identified infection/condition   Outcome: Progressing      Problem: SAFETY ADULT  Goal: Patient will remain free of falls  INTERVENTIONS:  - Assess patient frequently for physical needs  -  Identify cognitive and physical deficits and behaviors that affect risk of falls    -  [employer] fall precautions as indicated by assessment   - Educate patient/family on patient safety including physical limitations  - Instruct patient to call for assistance with activity based on assessment  - Modify environment to reduce risk of injury  - Consider OT/PT consult to assist with strengthening/mobility   Outcome: Progressing      Problem: DISCHARGE PLANNING  Goal: Discharge to home or other facility with appropriate resources  INTERVENTIONS:  - Identify barriers to discharge w/patient and caregiver  - Arrange for needed discharge resources and transportation as appropriate  - Identify discharge learning needs (meds, wound care, etc )  - Arrange for interpretive services to assist at discharge as needed  - Refer to Case Management Department for coordinating discharge planning if the patient needs post-hospital services based on physician/advanced practitioner order or complex needs related to functional status, cognitive ability, or social support system   Outcome: Progressing      Problem: Knowledge Deficit  Goal: Patient/family/caregiver demonstrates understanding of disease process, treatment plan, medications, and discharge instructions  Complete learning assessment and assess knowledge base  Interventions:  - Provide teaching at level of understanding  - Provide teaching via preferred learning methods   Outcome: Progressing      Problem: NEUROSENSORY - ADULT  Goal: Achieves stable or improved neurological status  INTERVENTIONS  - Monitor and report changes in neurological status  - Initiate measures to prevent increased intracranial pressure  - Maintain blood pressure and fluid volume within ordered parameters to optimize cerebral perfusion  - Monitor temperature, glucose, and sodium or any other associated labs   Initiate appropriate interventions as ordered  - Monitor for seizure activity   - Administer anti-seizure medications as ordered   Outcome: Progressing      Problem: GASTROINTESTINAL - ADULT  Goal: Minimal or absence of nausea and/or vomiting  INTERVENTIONS:  - Administer IV fluids as ordered to ensure adequate hydration  - Maintain NPO status until nausea and vomiting are resolved  - Nasogastric tube as ordered  - Administer ordered antiemetic medications as needed  - Provide nonpharmacologic comfort measures as appropriate  - Advance diet as tolerated, if ordered  - Nutrition services referral to assist patient with adequate nutrition and appropriate food choices   Outcome: Progressing      Problem: SKIN/TISSUE INTEGRITY - ADULT  Goal: Skin integrity remains intact  INTERVENTIONS  - Identify patients at risk for skin breakdown  - Assess and monitor skin integrity  - Assess and monitor nutrition and hydration status  - Monitor labs (i e  albumin)  - Assess for incontinence   - Turn and reposition patient  - Assist with mobility/ambulation  - Relieve pressure over bony prominences  - Avoid friction and shearing  - Provide appropriate hygiene as needed including keeping skin clean and dry  - Evaluate need for skin moisturizer/barrier cream  - Collaborate with interdisciplinary team (i e  Nutrition, Rehabilitation, etc )   - Patient/family teaching   Outcome: Progressing      Problem: MUSCULOSKELETAL - ADULT  Goal: Maintain or return mobility to safest level of function  INTERVENTIONS:  - Assess patient's ability to carry out ADLs; assess patient's baseline for ADL function and identify physical deficits which impact ability to perform ADLs (bathing, care of mouth/teeth, toileting, grooming, dressing, etc )  - Assess/evaluate cause of self-care deficits   - Assess range of motion  - Assess patient's mobility; develop plan if impaired  - Assess patient's need for assistive devices and provide as appropriate  - Encourage maximum independence but intervene and supervise when necessary  - Involve family in performance of ADLs  - Assess for home care needs following discharge   - Request OT consult to assist with ADL evaluation and planning for discharge  - Provide patient education as appropriate   Outcome: Progressing      Problem: Nutrition/Hydration-ADULT  Goal: Nutrient/Hydration intake appropriate for improving, restoring or maintaining nutritional needs  Monitor and assess patient's nutrition/hydration status for malnutrition (ex- brittle hair, bruises, dry skin, pale skin and conjunctiva, muscle wasting, smooth red tongue, and disorientation)  Collaborate with interdisciplinary team and initiate plan and interventions as ordered  Monitor patient's weight and dietary intake as ordered or per policy  Utilize nutrition screening tool and intervene per policy  Determine patient's food preferences and provide high-protein, high-caloric foods as appropriate       INTERVENTIONS:  - Monitor oral intake, urinary output, labs, and treatment plans  - Assess nutrition and hydration status and recommend course of action  - Evaluate amount of meals eaten  - Assist patient with eating if necessary   - Allow adequate time for meals  - Recommend/ encourage appropriate diets, oral nutritional supplements, and vitamin/mineral supplements  - Order, calculate, and assess calorie counts as needed  - Recommend, monitor, and adjust tube feedings and TPN/PPN based on assessed needs  - Assess need for intravenous fluids  - Provide specific nutrition/hydration education as appropriate  - Include patient/family/caregiver in decisions related to nutrition   Outcome: Progressing

## 2018-12-16 NOTE — PHYSICIAN ADVISOR
Current patient class: Inpatient  The patient is currently on Hospital Day: 3 at [place] [address]      The patient was admitted to the hospital at 02 40 12 20 89 on 12/14/18 for the following diagnosis:  Abdominal pain [R10 9]  Nausea and vomiting [R11 2]  Upper gastrointestinal bleed [K92 2]  CVA (cerebral vascular accident) ([address] ) [I63 9]  Right sided weakness [R53 1]       There is documentation in the medical record of an expected length of stay of at least 2 midnights  The patient is therefore expected to satisfy the 2 midnight benchmark and given the 2 midnight presumption is appropriate for INPATIENT ADMISSION  Given this expectation of a satisfying stay, CMS instructs us that the patient is most often appropriate for inpatient admission under part A provided medical necessity is documented in the chart  After review of the relevant documentation, labs, vital signs and test results, the patient is appropriate for INPATIENT ADMISSION  Admission to the hospital as an inpatient is a complex decision making process which requires the practitioner to consider the patients presenting complaint, history and physical examination and all relevant testing  With this in mind, in this case, the patient was deemed appropriate for INPATIENT ADMISSION  After review of the documentation and testing available at the time of the admission I concur with this clinical determination of medical necessity  Rationale is as follows: The patient is a 47 yrs old Female who presented to the ED at 12/14/2018  9:10 PM with a chief complaint of Vomiting (Pt c/o of nuaea and vomiting and generlized pain )     Given the need for further hospitalization, and along with the documentation of medical necessity present in the chart, the patient is appropriate for inpatient admission  The patient is expected to satisfy the 2 midnight benchmark, and will require further acute medical care   The patient does have comorbid conditions which increases the risk for significant adverse outcome  Given this the patient is appropriate for inpatient admission  The patients vitals on arrival were ED Triage Vitals   Temperature Pulse Respirations Blood Pressure SpO2   12/14/18 2100 12/14/18 2100 12/14/18 2100 12/14/18 2100 12/14/18 2100   97 7 °F (36 5 °C) 66 19 163/82 95 %      Temp Source Heart Rate Source Patient Position - Orthostatic VS BP Location FiO2 (%)   12/14/18 2100 12/14/18 2100 12/14/18 2100 12/15/18 0046 --   Tympanic Monitor Lying Right arm       Pain Score       12/15/18 0046       2           Past Medical History:   Diagnosis Date    Cholecystolithiasis      Past Surgical History:   Procedure Laterality Date    BREAST LUMPECTOMY Right     CHOLECYSTECTOMY      HYSTERECTOMY      KNEE SURGERY      TONSILLECTOMY             Consults have been placed to:   IP CONSULT TO NEUROLOGY  IP CONSULT TO CASE MANAGEMENT  IP CONSULT TO NUTRITION SERVICES  IP CONSULT TO GASTROENTEROLOGY    Vitals:    12/15/18 0404 12/15/18 0841 12/15/18 1516 12/15/18 2341   BP: 140/76 157/85 152/80 164/81   BP Location: Right arm Left arm Left arm Right arm   Pulse: 75 80 71 62   Resp: 18 20 18 18   Temp: 98 8 °F (37 1 °C) 98 7 °F (37 1 °C)  99 1 °F (37 3 °C)   TempSrc: Oral Oral  Oral   SpO2: 100% 100% 99% 100%   Weight:       Height:           Most recent labs:    Recent Labs      12/14/18   2202  12/15/18   0540   12/15/18   1832   WBC  16 87*  19 35*   --    --    HGB  15 3  14 3   < >  14 5   HCT  46 7*  43 8   < >  44 2   PLT  415*  368   --    --    K  4 3  3 9   --    --    CALCIUM  9 6  9 1   --    --    BUN  13  11   --    --    CREATININE  1 27  0 92   --    --    LIPASE  64*   --    --    --    INR  1 00   --    --    --    AST  14  22   --    --    ALT  26  24   --    --    ALKPHOS  72  62   --    --     < > = values in this interval not displayed         Scheduled Meds:  Current Facility-Administered Medications:  acetaminophen 650 mg Oral Q4H PRN Sabina Gould MD   atorvastatin 40 mg Oral QPM Sabina Gould MD   influenza vaccine 0 5 mL Intramuscular Prior to discharge Sabina Gould MD   nicotine 1 patch Transdermal Daily Sabina Gould MD   ondansetron 4 mg Intravenous Q6H PRN Sabina Gould MD   pantoprazole 40 mg Intravenous Q12H David Wong MD   tamoxifen 10 mg Oral Daily Sabina Gould MD     Continuous Infusions:   PRN Meds:   acetaminophen    influenza vaccine    ondansetron    Surgical procedures (if appropriate):

## 2018-12-17 ENCOUNTER — APPOINTMENT (INPATIENT)
Dept: NON INVASIVE DIAGNOSTICS | Facility: HOSPITAL | Age: 53
DRG: 379 | End: 2018-12-17
Payer: COMMERCIAL

## 2018-12-17 ENCOUNTER — ANESTHESIA EVENT (INPATIENT)
Dept: PERIOP | Facility: HOSPITAL | Age: 53
DRG: 379 | End: 2018-12-17
Payer: COMMERCIAL

## 2018-12-17 ENCOUNTER — ANESTHESIA (INPATIENT)
Dept: PERIOP | Facility: HOSPITAL | Age: 53
DRG: 379 | End: 2018-12-17
Payer: COMMERCIAL

## 2018-12-17 VITALS
BODY MASS INDEX: 36.11 KG/M2 | SYSTOLIC BLOOD PRESSURE: 141 MMHG | TEMPERATURE: 98.7 F | WEIGHT: 196.21 LBS | HEIGHT: 62 IN | DIASTOLIC BLOOD PRESSURE: 84 MMHG | OXYGEN SATURATION: 99 % | RESPIRATION RATE: 18 BRPM | HEART RATE: 87 BPM

## 2018-12-17 PROBLEM — R53.1 RIGHT SIDED WEAKNESS: Status: RESOLVED | Noted: 2018-12-15 | Resolved: 2018-12-17

## 2018-12-17 PROBLEM — K29.01 ACUTE GASTRITIS WITH HEMORRHAGE: Status: ACTIVE | Noted: 2018-12-17

## 2018-12-17 PROBLEM — R10.13 EPIGASTRIC PAIN: Status: ACTIVE | Noted: 2018-12-17

## 2018-12-17 LAB
ANION GAP SERPL CALCULATED.3IONS-SCNC: 11 MMOL/L (ref 4–13)
ATRIAL RATE: 73 BPM
BASOPHILS # BLD AUTO: 0.08 THOUSANDS/ΜL (ref 0–0.1)
BASOPHILS NFR BLD AUTO: 1 % (ref 0–1)
BUN SERPL-MCNC: 10 MG/DL (ref 5–25)
CALCIUM SERPL-MCNC: 9.1 MG/DL (ref 8.3–10.1)
CHLORIDE SERPL-SCNC: 101 MMOL/L (ref 100–108)
CO2 SERPL-SCNC: 26 MMOL/L (ref 21–32)
CREAT SERPL-MCNC: 0.99 MG/DL (ref 0.6–1.3)
EOSINOPHIL # BLD AUTO: 0.06 THOUSAND/ΜL (ref 0–0.61)
EOSINOPHIL NFR BLD AUTO: 1 % (ref 0–6)
ERYTHROCYTE [DISTWIDTH] IN BLOOD BY AUTOMATED COUNT: 13.2 % (ref 11.6–15.1)
GFR SERPL CREATININE-BSD FRML MDRD: 66 ML/MIN/1.73SQ M
GLUCOSE SERPL-MCNC: 95 MG/DL (ref 65–140)
HCT VFR BLD AUTO: 44.1 % (ref 34.8–46.1)
HGB BLD-MCNC: 14.4 G/DL (ref 11.5–15.4)
IMM GRANULOCYTES # BLD AUTO: 0.04 THOUSAND/UL (ref 0–0.2)
IMM GRANULOCYTES NFR BLD AUTO: 0 % (ref 0–2)
LYMPHOCYTES # BLD AUTO: 3.87 THOUSANDS/ΜL (ref 0.6–4.47)
LYMPHOCYTES NFR BLD AUTO: 32 % (ref 14–44)
MCH RBC QN AUTO: 28.3 PG (ref 26.8–34.3)
MCHC RBC AUTO-ENTMCNC: 32.7 G/DL (ref 31.4–37.4)
MCV RBC AUTO: 87 FL (ref 82–98)
MONOCYTES # BLD AUTO: 0.8 THOUSAND/ΜL (ref 0.17–1.22)
MONOCYTES NFR BLD AUTO: 7 % (ref 4–12)
NEUTROPHILS # BLD AUTO: 7.15 THOUSANDS/ΜL (ref 1.85–7.62)
NEUTS SEG NFR BLD AUTO: 59 % (ref 43–75)
NRBC BLD AUTO-RTO: 0 /100 WBCS
P AXIS: 19 DEGREES
PLATELET # BLD AUTO: 355 THOUSANDS/UL (ref 149–390)
PMV BLD AUTO: 9.5 FL (ref 8.9–12.7)
POTASSIUM SERPL-SCNC: 3.8 MMOL/L (ref 3.5–5.3)
PR INTERVAL: 136 MS
QRS AXIS: 80 DEGREES
QRSD INTERVAL: 74 MS
QT INTERVAL: 372 MS
QTC INTERVAL: 409 MS
RBC # BLD AUTO: 5.09 MILLION/UL (ref 3.81–5.12)
SODIUM SERPL-SCNC: 138 MMOL/L (ref 136–145)
T WAVE AXIS: 70 DEGREES
VENTRICULAR RATE: 73 BPM
WBC # BLD AUTO: 12 THOUSAND/UL (ref 4.31–10.16)

## 2018-12-17 PROCEDURE — 87077 CULTURE AEROBIC IDENTIFY: CPT | Performed by: INTERNAL MEDICINE

## 2018-12-17 PROCEDURE — 99238 HOSP IP/OBS DSCHRG MGMT 30/<: CPT | Performed by: INTERNAL MEDICINE

## 2018-12-17 PROCEDURE — 0DB78ZX EXCISION OF STOMACH, PYLORUS, VIA NATURAL OR ARTIFICIAL OPENING ENDOSCOPIC, DIAGNOSTIC: ICD-10-PCS | Performed by: INTERNAL MEDICINE

## 2018-12-17 PROCEDURE — 93010 ELECTROCARDIOGRAM REPORT: CPT | Performed by: INTERNAL MEDICINE

## 2018-12-17 PROCEDURE — C9113 INJ PANTOPRAZOLE SODIUM, VIA: HCPCS | Performed by: INTERNAL MEDICINE

## 2018-12-17 PROCEDURE — 99232 SBSQ HOSP IP/OBS MODERATE 35: CPT | Performed by: INTERNAL MEDICINE

## 2018-12-17 PROCEDURE — 85025 COMPLETE CBC W/AUTO DIFF WBC: CPT | Performed by: INTERNAL MEDICINE

## 2018-12-17 PROCEDURE — 80048 BASIC METABOLIC PNL TOTAL CA: CPT | Performed by: INTERNAL MEDICINE

## 2018-12-17 PROCEDURE — 88305 TISSUE EXAM BY PATHOLOGIST: CPT | Performed by: PATHOLOGY

## 2018-12-17 PROCEDURE — 0DB98ZX EXCISION OF DUODENUM, VIA NATURAL OR ARTIFICIAL OPENING ENDOSCOPIC, DIAGNOSTIC: ICD-10-PCS | Performed by: INTERNAL MEDICINE

## 2018-12-17 RX ORDER — SUCRALFATE ORAL 1 G/10ML
1000 SUSPENSION ORAL
Qty: 420 ML | Refills: 0 | Status: SHIPPED | OUTPATIENT
Start: 2018-12-17

## 2018-12-17 RX ORDER — SUCRALFATE ORAL 1 G/10ML
1000 SUSPENSION ORAL
Status: DISCONTINUED | OUTPATIENT
Start: 2018-12-17 | End: 2018-12-17 | Stop reason: HOSPADM

## 2018-12-17 RX ORDER — SODIUM CHLORIDE 9 MG/ML
INJECTION, SOLUTION INTRAVENOUS CONTINUOUS PRN
Status: DISCONTINUED | OUTPATIENT
Start: 2018-12-17 | End: 2018-12-17 | Stop reason: SURG

## 2018-12-17 RX ORDER — PROPOFOL 10 MG/ML
INJECTION, EMULSION INTRAVENOUS AS NEEDED
Status: DISCONTINUED | OUTPATIENT
Start: 2018-12-17 | End: 2018-12-17 | Stop reason: SURG

## 2018-12-17 RX ORDER — PANTOPRAZOLE SODIUM 40 MG/1
40 TABLET, DELAYED RELEASE ORAL DAILY
Qty: 30 TABLET | Refills: 0 | Status: SHIPPED | OUTPATIENT
Start: 2018-12-17 | End: 2019-01-16

## 2018-12-17 RX ADMIN — PROPOFOL 200 MG: 10 INJECTION, EMULSION INTRAVENOUS at 16:40

## 2018-12-17 RX ADMIN — SODIUM CHLORIDE: 0.9 INJECTION, SOLUTION INTRAVENOUS at 16:37

## 2018-12-17 RX ADMIN — ONDANSETRON 4 MG: 2 INJECTION, SOLUTION INTRAMUSCULAR; INTRAVENOUS at 01:29

## 2018-12-17 RX ADMIN — PANTOPRAZOLE SODIUM 40 MG: 40 INJECTION, POWDER, FOR SOLUTION INTRAVENOUS at 08:59

## 2018-12-17 RX ADMIN — PROPOFOL 50 MG: 10 INJECTION, EMULSION INTRAVENOUS at 16:45

## 2018-12-17 RX ADMIN — TAMOXIFEN CITRATE 10 MG: 10 TABLET ORAL at 08:59

## 2018-12-17 NOTE — PROGRESS NOTES
Pt  Sent via stretcher to [facility] for scheduled EGD procedure  Report given earlier this AM to Jo Rajan

## 2018-12-17 NOTE — ASSESSMENT & PLAN NOTE
Patient was hydrated with normal saline solution, nausea was controlled with antiemetics and IV Protonix  Nausea vomiting resolved on discharge      MRI of the brain was done that failed to reveal any strokes or metastases

## 2018-12-17 NOTE — ASSESSMENT & PLAN NOTE
Patient admitted with nausea vomiting and hematemesis  Patient was placed on IV fluids and IV Protonix  GI saw the patient who performed EGD that showed acute gastritis  Patient was discharged in stable condition home on Protonix and Carafate  She will follow up with GI as an outpatient  There was mild epigastric tenderness on day of discharge on physical examination

## 2018-12-17 NOTE — SOCIAL WORK
Met with Pt  Pt presents AA&Ox3  Discussed role of   Pt lives alone in apartment  Pt is independent with adls and ambulation  Pt drives and goes grocery shopping  Pt goes to [facility] in [place]  Pt does not have healthcare insurance  Pt informed  will make referral to [facility] and in agreement  Referral to [facility]  Will follow

## 2018-12-17 NOTE — OCCUPATIONAL THERAPY NOTE
Occupational Therapy Screen        Patient Name: Nico Christensen  Today's Date: 12/17/2018      Received orders, reviewed chart and spoke to nursing who reports patient is ambulating and independent (with the exception being limited by nausea ) No acute OT indicated at this time  Please reconsult as necessary       [place] Etienne, MAYKEL, OTR/L

## 2018-12-17 NOTE — OP NOTE
ESOPHAGOGASTRODUODENOSCOPY    PROCEDURE: EGD    SEDATION: Monitored anesthesia care, check anesthesia records    ASA Class: III     INDICATIONS:  NAUSEA, VOMITING, HEMATEMESIS    CONSENT:  Informed consent was obtained for the procedure, including sedation after explaining the risks and benefits of the procedure  Risks including but not limited to bleeding, perforation, infection, and missed lesion  PREPARATION:   Telemetry, pulse oximetry, blood pressure were monitored throughout the procedure  Patient was identified by myself both verbally and by visual inspection of ID band  DESCRIPTION:   Patient was placed in the left lateral decubitus position and was sedated with the above medication  The gastroscope was introduced in to the oropharynx and the esophagus was intubated under direct visualization  Scope was passed down the esophagus up to 2nd part of the duodenum  A careful inspection was made as the gastroscope was withdrawn, including a retroflexed view of the stomach; findings and interventions are described below  FINDINGS:    #1  Esophagus- NORMAL, Z-LINE AT 36 CM, NO BLEEDING MASS OR ULCERATION    #2  Stomach- THICKENED PRE-PYLORIC ANTRAL FOLDS BIOPSIED FOR PATHOLOGY, NO MASS OR ULCERATION  ANTRAL BIOPSY ALSO OBTAINED FOR H PYLORI VIA CLOTEST   OTHERWISE NORMAL GASTRIC MUCOSA  NO MASS STRICTURE OR OUTLET OBSTRUCTION  #3  Duodenum- NORMAL DUODENAL SWEEP  RANDOM BIOPSIES OBTAINED TO RULE OUT CELIAC  ESTIMATED BLOOD LOSS: None    SPECIMENS:None         IMPRESSIONS:      MILD ANTRAL GASTRITIS OTHER WAS UNREMARKABLE EGD  RECOMMENDATIONS:     TRIAL OF CARAFATE  ADVANCE DIET  TREAT H PYLORI IF POSITIVE    COMPLICATIONS:  None; patient tolerated the procedure well            DISPOSITION: PACU           CONDITION: Stable

## 2018-12-17 NOTE — PLAN OF CARE
Problem: Neurological Deficit  Goal: Neurological status is stable or improving  Interventions:  - Monitor and assess patient's level of consciousness, motor function, sensory function, and level of assistance needed for ADLs  - Monitor and report changes from baseline  Collaborate with interdisciplinary team to initiate plan and implement interventions as ordered  - Provide and maintain a safe environment  - Utilize seizure precautions  - Utilize fall precautions  - Utilize aspiration precautions  - Utilize bleeding precautions  Outcome: Completed Date Met: 12/17/18      Problem: Activity Intolerance/Impaired Mobility  Goal: Mobility/activity is maintained at optimum level for patient  Interventions:  - Assess and monitor patient  barriers to mobility and need for assistive/adaptive devices  - Assess patient's emotional response to limitations  - Collaborate with interdisciplinary team and initiate plans and interventions as ordered  - Encourage independent activity per ability   - Maintain proper body alignment  - Perform active/passive rom as tolerated/ordered  - Plan activities to conserve energy   - Turn patient   Outcome: Completed Date Met: 12/17/18      Problem: Communication Impairment  Goal: Ability to express needs and understand communication  Assess patient's communication skills and ability to understand information  Patient will demonstrate use of effective communication techniques, alternative methods of communication and understanding even if not able to speak  - Encourage communication and provide alternate methods of communication as needed  - Collaborate with case management/ for discharge needs  - Include patient/family/caregiver in decisions related to communication     Outcome: Completed Date Met: 12/17/18      Problem: Potential for Aspiration  Goal: Non-ventilated patient's risk of aspiration is minimized  Assess and monitor vital signs, respiratory status, and labs (WBC)  Monitor for signs of aspiration (tachypnea, cough, rales, wheezing, cyanosis, fever)  - Assess and monitor patient's ability to swallow  - Place patient up in chair to eat if possible  - HOB up at 90 degrees to eat if unable to get patient up into chair   - Supervise patient during oral intake  - Instruct patient to take small bites  - Instruct patient to take small single sips when taking liquids  - Follow patient-specific strategies generated by speech pathologist    Outcome: Completed Date Met: 12/17/18      Problem: Nutrition  Goal: Nutrition/Hydration status is improving  Monitor and assess patient's nutrition/hydration status for malnutrition (ex- brittle hair, bruises, dry skin, pale skin and conjunctiva, muscle wasting, smooth red tongue, and disorientation)  Collaborate with interdisciplinary team and initiate plan and interventions as ordered  Monitor patient's weight and dietary intake as ordered or per policy  Utilize nutrition screening tool and intervene per policy  Determine patient's food preferences and provide high-protein, high-caloric foods as appropriate  - Assist patient with eating   - Allow adequate time for meals   - Encourage patient to take dietary supplement as ordered  - Collaborate with clinical nutritionist   - Include patient/family/caregiver in decisions related to nutrition     Outcome: Completed Date Met: 12/17/18      Problem: PAIN - ADULT  Goal: Verbalizes/displays adequate comfort level or baseline comfort level  Interventions:  - Encourage patient to monitor pain and request assistance  - Assess pain using appropriate pain scale  - Administer analgesics based on type and severity of pain and evaluate response  - Implement non-pharmacological measures as appropriate and evaluate response  - Consider cultural and social influences on pain and pain management  - Notify physician/advanced practitioner if interventions unsuccessful or patient reports new pain   Outcome: Progressing      Problem: INFECTION - ADULT  Goal: Absence or prevention of progression during hospitalization  INTERVENTIONS:  - Assess and monitor for signs and symptoms of infection  - Monitor lab/diagnostic results  - Monitor all insertion sites, i e  indwelling lines, tubes, and drains  - Monitor endotracheal (as able) and nasal secretions for changes in amount and color  - [place] appropriate cooling/warming therapies per order  - Administer medications as ordered  - Instruct and encourage patient and family to use good hand hygiene technique  - Identify and instruct in appropriate isolation precautions for identified infection/condition   Outcome: Progressing      Problem: SAFETY ADULT  Goal: Patient will remain free of falls  INTERVENTIONS:  - Assess patient frequently for physical needs  -  Identify cognitive and physical deficits and behaviors that affect risk of falls    -  [place] fall precautions as indicated by assessment   - Educate patient/family on patient safety including physical limitations  - Instruct patient to call for assistance with activity based on assessment  - Modify environment to reduce risk of injury  - Consider OT/PT consult to assist with strengthening/mobility    Outcome: Progressing      Problem: DISCHARGE PLANNING  Goal: Discharge to home or other facility with appropriate resources  INTERVENTIONS:  - Identify barriers to discharge w/patient and caregiver  - Arrange for needed discharge resources and transportation as appropriate  - Identify discharge learning needs (meds, wound care, etc )  - Arrange for interpretive services to assist at discharge as needed  - Refer to Case Management Department for coordinating discharge planning if the patient needs post-hospital services based on physician/advanced practitioner order or complex needs related to functional status, cognitive ability, or social support system   Outcome: Progressing      Problem: Knowledge Deficit  Goal: Patient/family/caregiver demonstrates understanding of disease process, treatment plan, medications, and discharge instructions  Complete learning assessment and assess knowledge base  Interventions:  - Provide teaching at level of understanding  - Provide teaching via preferred learning methods   Outcome: Progressing      Problem: NEUROSENSORY - ADULT  Goal: Achieves stable or improved neurological status  INTERVENTIONS  - Monitor and report changes in neurological status  - Initiate measures to prevent increased intracranial pressure  - Maintain blood pressure and fluid volume within ordered parameters to optimize cerebral perfusion  - Monitor temperature, glucose, and sodium or any other associated labs   Initiate appropriate interventions as ordered  - Monitor for seizure activity   - Administer anti-seizure medications as ordered   Outcome: Completed Date Met: 12/17/18      Problem: GASTROINTESTINAL - ADULT  Goal: Minimal or absence of nausea and/or vomiting  INTERVENTIONS:  - Administer IV fluids as ordered to ensure adequate hydration  - Maintain NPO status until nausea and vomiting are resolved  - Nasogastric tube as ordered  - Administer ordered antiemetic medications as needed  - Provide nonpharmacologic comfort measures as appropriate  - Advance diet as tolerated, if ordered  - Nutrition services referral to assist patient with adequate nutrition and appropriate food choices   Outcome: Progressing      Problem: SKIN/TISSUE INTEGRITY - ADULT  Goal: Skin integrity remains intact  INTERVENTIONS  - Identify patients at risk for skin breakdown  - Assess and monitor skin integrity  - Assess and monitor nutrition and hydration status  - Monitor labs (i e  albumin)  - Assess for incontinence   - Turn and reposition patient  - Assist with mobility/ambulation  - Relieve pressure over bony prominences  - Avoid friction and shearing  - Provide appropriate hygiene as needed including keeping skin clean and dry  - Evaluate need for skin moisturizer/barrier cream  - Collaborate with interdisciplinary team (i e  Nutrition, Rehabilitation, etc )   - Patient/family teaching   Outcome: Progressing      Problem: MUSCULOSKELETAL - ADULT  Goal: Maintain or return mobility to safest level of function  INTERVENTIONS:  - Assess patient's ability to carry out ADLs; assess patient's baseline for ADL function and identify physical deficits which impact ability to perform ADLs (bathing, care of mouth/teeth, toileting, grooming, dressing, etc )  - Assess/evaluate cause of self-care deficits   - Assess range of motion  - Assess patient's mobility; develop plan if impaired  - Assess patient's need for assistive devices and provide as appropriate  - Encourage maximum independence but intervene and supervise when necessary  - Involve family in performance of ADLs  - Assess for home care needs following discharge   - Request OT consult to assist with ADL evaluation and planning for discharge  - Provide patient education as appropriate   Outcome: Progressing      Problem: Nutrition/Hydration-ADULT  Goal: Nutrient/Hydration intake appropriate for improving, restoring or maintaining nutritional needs  Monitor and assess patient's nutrition/hydration status for malnutrition (ex- brittle hair, bruises, dry skin, pale skin and conjunctiva, muscle wasting, smooth red tongue, and disorientation)  Collaborate with interdisciplinary team and initiate plan and interventions as ordered  Monitor patient's weight and dietary intake as ordered or per policy  Utilize nutrition screening tool and intervene per policy  Determine patient's food preferences and provide high-protein, high-caloric foods as appropriate       INTERVENTIONS:  - Monitor oral intake, urinary output, labs, and treatment plans  - Assess nutrition and hydration status and recommend course of action  - Evaluate amount of meals eaten  - Assist patient with eating if necessary   - Allow adequate time for meals  - Recommend/ encourage appropriate diets, oral nutritional supplements, and vitamin/mineral supplements  - Order, calculate, and assess calorie counts as needed  - Recommend, monitor, and adjust tube feedings and TPN/PPN based on assessed needs  - Assess need for intravenous fluids  - Provide specific nutrition/hydration education as appropriate  - Include patient/family/caregiver in decisions related to nutrition   Outcome: Progressing      Problem: Potential for Falls  Goal: Patient will remain free of falls  INTERVENTIONS:  - Assess patient frequently for physical needs  -  Identify cognitive and physical deficits and behaviors that affect risk of falls    -  [place] fall precautions as indicated by assessment   - Educate patient/family on patient safety including physical limitations  - Instruct patient to call for assistance with activity based on assessment  - Modify environment to reduce risk of injury  - Consider OT/PT consult to assist with strengthening/mobility    Outcome: Progressing

## 2018-12-17 NOTE — PROGRESS NOTES
Pt  Refused flu vaccine @ discharge   Pt  States "I'll know I'll get sick from it & I don't ever want to come back here to this hospital "

## 2018-12-17 NOTE — PHYSICAL THERAPY NOTE
PT screen  Order rec'd chart reviewed  Met with pt who states she has no PT needs  Is amb ind no problems  Awaiting gi testing   Screen only d/c PT

## 2018-12-17 NOTE — DISCHARGE SUMMARY
Discharge- America Nunez 1965, 47 y o  female MRN: [id number]    Unit/Bed#: [address] [place] [address] Encounter: [id number]    Primary Care Provider: No primary care provider on file  Date and time admitted to hospital: 12/14/2018  9:10 PM        * Acute gastritis with hemorrhage   Assessment & Plan    Patient admitted with nausea vomiting and hematemesis  Patient was placed on IV fluids and IV Protonix  GI saw the patient who performed EGD that showed acute gastritis  Patient was discharged in stable condition home on Protonix and Carafate  She will follow up with GI as an outpatient  There was mild epigastric tenderness on day of discharge on physical examination  Nausea and vomiting   Assessment & Plan    Patient was hydrated with normal saline solution, nausea was controlled with antiemetics and IV Protonix  Nausea vomiting resolved on discharge  MRI of the brain was done that failed to reveal any strokes or metastases               Hospital Course:     America Nunez is a 47 y o  female patient who originally presented to the hospital on   Admission Orders     Ordered        12/14/18 2339  Inpatient Admission (expected length of stay for this patient is greater than two midnights)  Once            due to nausea vomiting hematemesis    Please see above list of diagnoses and related plan for additional information  Condition at Discharge:  good      Discharge instructions/Information to patient and family:   See after visit summary for information provided to patient and family  Provisions for Follow-Up Care:  See after visit summary for information related to follow-up care and any pertinent home health orders  Disposition:     Home       Discharge Statement:  I spent 22 minutes discharging the patient  This time was spent on the day of discharge  I had direct contact with the patient on the day of discharge   Greater than 50% of the total time was spent examining patient, answering all patient questions, arranging and discussing plan of care with patient as well as directly providing post-discharge instructions  Additional time then spent on discharge activities  Discharge Medications:  See after visit summary for reconciled discharge medications provided to patient and family        ** Please Note: This note has been constructed using a voice recognition system **

## 2018-12-17 NOTE — ANESTHESIA PREPROCEDURE EVALUATION
Review of Systems/Medical History  Patient summary reviewed  Chart reviewed      Cardiovascular  Negative cardio ROS    Pulmonary  Negative pulmonary ROS Smoker cigarette smoker  , Tobacco cessation counseling given ,        GI/Hepatic  Negative GI/hepatic ROS          Negative  ROS        Endo/Other  Negative endo/other ROS   Obesity    GYN  Negative gynecology ROS     Comment: S/p MIRIAM     Hematology  Negative hematology ROS      Musculoskeletal  Negative musculoskeletal ROS        Neurology  Negative neurology ROS      Psychology   Negative psychology ROS     Comment: H/o illicit drug abuse         Physical Exam    Airway    Mallampati score: II  TM Distance: >3 FB  Neck ROM: full     Dental       Cardiovascular  Comment: Negative ROS, Rhythm: regular, Rate: normal, Cardiovascular exam normal    Pulmonary  Pulmonary exam normal Breath sounds clear to auscultation,     Other Findings        Anesthesia Plan  ASA Score- 3     Anesthesia Type- IV sedation with anesthesia with ASA Monitors  Additional Monitors:   Airway Plan:         Plan Factors-    Induction- intravenous  Postoperative Plan-     Informed Consent- Anesthetic plan and risks discussed with patient  I personally reviewed this patient with the CRNA  Discussed and agreed on the Anesthesia Plan with the CRNA  Felecia Davila

## 2018-12-17 NOTE — PROGRESS NOTES
Progress Note - Socorro Ryan 47 y o  female MRN: [id number]    Unit/Bed#: [address] [place] [address] [id number] Encounter: [id number]      Assessment:    Principal Problem:    Epigastric pain  Active Problems:    Nausea and vomiting    Upper gastrointestinal bleed  Resolved Problems:    Right sided weakness      Plan:  Patient is for EGD at 1330 today  If this is stable the patient should be discharged  I told her that she can only be evaluated here in the hospital for the problems at hand she needs to find a primary care physician  I recommended the health center in the [facility] area  Subjective:   Still complains of epigastric pain no further neurologic symptoms  No nausea or vomiting  Also feels she has chronic back pain  Objective:     Vitals: Blood pressure 154/96, pulse 81, temperature 98 7 °F (37 1 °C), temperature source Oral, resp  rate 17, height 5' 2" (1 575 m), weight 89 kg (196 lb 3 4 oz), SpO2 95 %  ,Body mass index is 35 89 kg/m²  No intake or output data in the 24 hours ending 12/17/18 1028    Physical Exam:    Alert and awake in no acute distress  Lungs clear to auscultation bilaterally  Heart regular rate and rythm, normal heart sounds  Abdomen soft, active bowel sounds, slight tenderness in the abdomen  Extremities: no cyanosis, clubbing or edema  Neuro: alert, no facial asymmetry,  no weakness      Invasive Devices     Peripheral Intravenous Line            Peripheral IV 12/14/18 Right Antecubital 2 days                            Lab, Imaging and other studies: I have personally reviewed pertinent reports         Results from last 7 days  Lab Units 12/17/18  0602 12/16/18  0546 12/16/18  0113  12/15/18  0540   WBC Thousand/uL 12 00* 12 43*  --   --  19 35*   HEMOGLOBIN g/dL 14 4 14 1 14 2  < > 14 3   HEMATOCRIT % 44 1 41 8 43 5  < > 43 8   PLATELETS Thousands/uL 355 320  --   --  368   NEUTROS PCT % 59 68  --   --  80*   LYMPHS PCT % 32 24  --   --  13*   MONOS PCT % 7 8  --   --  7   EOS PCT % 1 0  --   --  0   < > = values in this interval not displayed  Results from last 7 days  Lab Units 12/17/18  0602 12/16/18  0546 12/15/18  0540 12/14/18  2202   POTASSIUM mmol/L 3 8 3 5 3 9 4 3   CHLORIDE mmol/L 101 101 103 102   CO2 mmol/L 26 24 22 21   BUN mg/dL 10 9 11 13   CREATININE mg/dL 0 99 0 89 0 92 1 27   CALCIUM mg/dL 9 1 8 9 9 1 9 6   ALK PHOS U/L  --  64 62 72   ALT U/L  --  28 24 26   AST U/L  --  20 22 14     No results found for: TROPONINI, CKMB, CKTOTAL    Results from last 7 days  Lab Units 12/14/18  2202   INR  1 00     No results found for: Tali Crespo, SPUTUMCULTUR    Imaging:  Results for orders placed during the hospital encounter of 12/14/18   X-ray chest 1 view portable    Narrative CHEST     INDICATION:   stroke  COMPARISON:  None    EXAM PERFORMED/VIEWS:  XR CHEST PORTABLE  portable AP semierect view  FINDINGS:    Cardiomediastinal silhouette appears unremarkable  The lungs are clear  No pneumothorax or pleural effusion  Osseous structures appear within normal limits for patient age  Impression No acute cardiopulmonary disease  Workstation performed: [id number]       No results found for this or any previous visit  PATIENT CENTERED ROUNDS: I have performed rounds with the nursing staff  This note has been constructed using a voice recognition system      Dima Belle MD

## 2018-12-17 NOTE — PLAN OF CARE
Problem: PAIN - ADULT  Goal: Verbalizes/displays adequate comfort level or baseline comfort level  Interventions:  - Encourage patient to monitor pain and request assistance  - Assess pain using appropriate pain scale  - Administer analgesics based on type and severity of pain and evaluate response  - Implement non-pharmacological measures as appropriate and evaluate response  - Consider cultural and social influences on pain and pain management  - Notify physician/advanced practitioner if interventions unsuccessful or patient reports new pain   Outcome: Adequate for Discharge      Problem: INFECTION - ADULT  Goal: Absence or prevention of progression during hospitalization  INTERVENTIONS:  - Assess and monitor for signs and symptoms of infection  - Monitor lab/diagnostic results  - Monitor all insertion sites, i e  indwelling lines, tubes, and drains  - Monitor endotracheal (as able) and nasal secretions for changes in amount and color  - Foster appropriate cooling/warming therapies per order  - Administer medications as ordered  - Instruct and encourage patient and family to use good hand hygiene technique  - Identify and instruct in appropriate isolation precautions for identified infection/condition   Outcome: Adequate for Discharge      Problem: SAFETY ADULT  Goal: Patient will remain free of falls  INTERVENTIONS:  - Assess patient frequently for physical needs  -  Identify cognitive and physical deficits and behaviors that affect risk of falls    -  Foster fall precautions as indicated by assessment   - Educate patient/family on patient safety including physical limitations  - Instruct patient to call for assistance with activity based on assessment  - Modify environment to reduce risk of injury  - Consider OT/PT consult to assist with strengthening/mobility    Outcome: Adequate for Discharge      Problem: DISCHARGE PLANNING  Goal: Discharge to home or other facility with appropriate resources  INTERVENTIONS:  - Identify barriers to discharge w/patient and caregiver  - Arrange for needed discharge resources and transportation as appropriate  - Identify discharge learning needs (meds, wound care, etc )  - Arrange for interpretive services to assist at discharge as needed  - Refer to Case Management Department for coordinating discharge planning if the patient needs post-hospital services based on physician/advanced practitioner order or complex needs related to functional status, cognitive ability, or social support system   Outcome: Adequate for Discharge      Problem: Knowledge Deficit  Goal: Patient/family/caregiver demonstrates understanding of disease process, treatment plan, medications, and discharge instructions  Complete learning assessment and assess knowledge base    Interventions:  - Provide teaching at level of understanding  - Provide teaching via preferred learning methods   Outcome: Adequate for Discharge      Problem: GASTROINTESTINAL - ADULT  Goal: Minimal or absence of nausea and/or vomiting  INTERVENTIONS:  - Administer IV fluids as ordered to ensure adequate hydration  - Maintain NPO status until nausea and vomiting are resolved  - Nasogastric tube as ordered  - Administer ordered antiemetic medications as needed  - Provide nonpharmacologic comfort measures as appropriate  - Advance diet as tolerated, if ordered  - Nutrition services referral to assist patient with adequate nutrition and appropriate food choices   Outcome: Adequate for Discharge      Problem: SKIN/TISSUE INTEGRITY - ADULT  Goal: Skin integrity remains intact  INTERVENTIONS  - Identify patients at risk for skin breakdown  - Assess and monitor skin integrity  - Assess and monitor nutrition and hydration status  - Monitor labs (i e  albumin)  - Assess for incontinence   - Turn and reposition patient  - Assist with mobility/ambulation  - Relieve pressure over bony prominences  - Avoid friction and shearing  - Provide appropriate hygiene as needed including keeping skin clean and dry  - Evaluate need for skin moisturizer/barrier cream  - Collaborate with interdisciplinary team (i e  Nutrition, Rehabilitation, etc )   - Patient/family teaching   Outcome: Adequate for Discharge      Problem: MUSCULOSKELETAL - ADULT  Goal: Maintain or return mobility to safest level of function  INTERVENTIONS:  - Assess patient's ability to carry out ADLs; assess patient's baseline for ADL function and identify physical deficits which impact ability to perform ADLs (bathing, care of mouth/teeth, toileting, grooming, dressing, etc )  - Assess/evaluate cause of self-care deficits   - Assess range of motion  - Assess patient's mobility; develop plan if impaired  - Assess patient's need for assistive devices and provide as appropriate  - Encourage maximum independence but intervene and supervise when necessary  - Involve family in performance of ADLs  - Assess for home care needs following discharge   - Request OT consult to assist with ADL evaluation and planning for discharge  - Provide patient education as appropriate   Outcome: Adequate for Discharge      Problem: Nutrition/Hydration-ADULT  Goal: Nutrient/Hydration intake appropriate for improving, restoring or maintaining nutritional needs  Monitor and assess patient's nutrition/hydration status for malnutrition (ex- brittle hair, bruises, dry skin, pale skin and conjunctiva, muscle wasting, smooth red tongue, and disorientation)  Collaborate with interdisciplinary team and initiate plan and interventions as ordered  Monitor patient's weight and dietary intake as ordered or per policy  Utilize nutrition screening tool and intervene per policy  Determine patient's food preferences and provide high-protein, high-caloric foods as appropriate       INTERVENTIONS:  - Monitor oral intake, urinary output, labs, and treatment plans  - Assess nutrition and hydration status and recommend course of action  - Evaluate amount of meals eaten  - Assist patient with eating if necessary   - Allow adequate time for meals  - Recommend/ encourage appropriate diets, oral nutritional supplements, and vitamin/mineral supplements  - Order, calculate, and assess calorie counts as needed  - Recommend, monitor, and adjust tube feedings and TPN/PPN based on assessed needs  - Assess need for intravenous fluids  - Provide specific nutrition/hydration education as appropriate  - Include patient/family/caregiver in decisions related to nutrition   Outcome: Adequate for Discharge      Problem: Potential for Falls  Goal: Patient will remain free of falls  INTERVENTIONS:  - Assess patient frequently for physical needs  -  Identify cognitive and physical deficits and behaviors that affect risk of falls    -  [place] fall precautions as indicated by assessment   - Educate patient/family on patient safety including physical limitations  - Instruct patient to call for assistance with activity based on assessment  - Modify environment to reduce risk of injury  - Consider OT/PT consult to assist with strengthening/mobility    Outcome: Adequate for Discharge

## 2018-12-18 LAB — UREASE TISS QL: NEGATIVE
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
## 2020-05-30 ENCOUNTER — HOSPITAL ENCOUNTER (EMERGENCY)
Facility: HOSPITAL | Age: 55
Discharge: HOME | End: 2020-05-30
Attending: EMERGENCY MEDICINE

## 2020-05-30 ENCOUNTER — APPOINTMENT (EMERGENCY)
Dept: CT IMAGING | Facility: HOSPITAL | Age: 55
End: 2020-05-30

## 2020-05-30 VITALS
WEIGHT: 200 LBS | DIASTOLIC BLOOD PRESSURE: 80 MMHG | SYSTOLIC BLOOD PRESSURE: 154 MMHG | OXYGEN SATURATION: 96 % | BODY MASS INDEX: 36.8 KG/M2 | TEMPERATURE: 98.7 F | HEART RATE: 75 BPM | RESPIRATION RATE: 18 BRPM | HEIGHT: 62 IN

## 2020-05-30 DIAGNOSIS — R11.10 VOMITING: ICD-10-CM

## 2020-05-30 DIAGNOSIS — K29.70 GASTRITIS: Primary | ICD-10-CM

## 2020-05-30 LAB
ALBUMIN SERPL BCP-MCNC: 4 G/DL (ref 3.5–5)
ALP SERPL-CCNC: 62 U/L (ref 46–116)
ALT SERPL W P-5'-P-CCNC: 19 U/L (ref 12–78)
ANION GAP SERPL CALCULATED.3IONS-SCNC: 17 MMOL/L (ref 4–13)
AST SERPL W P-5'-P-CCNC: 14 U/L (ref 5–45)
BASOPHILS # BLD MANUAL: 0 THOUSAND/UL (ref 0–0.1)
BASOPHILS NFR MAR MANUAL: 0 % (ref 0–1)
BILIRUB SERPL-MCNC: 0.6 MG/DL (ref 0.2–1)
BUN SERPL-MCNC: 12 MG/DL (ref 5–25)
CALCIUM SERPL-MCNC: 9.5 MG/DL (ref 8.3–10.1)
CHLORIDE SERPL-SCNC: 101 MMOL/L (ref 100–108)
CO2 SERPL-SCNC: 20 MMOL/L (ref 21–32)
CREAT SERPL-MCNC: 1.25 MG/DL (ref 0.6–1.3)
EOSINOPHIL # BLD MANUAL: 0 THOUSAND/UL (ref 0–0.4)
EOSINOPHIL NFR BLD MANUAL: 0 % (ref 0–6)
ERYTHROCYTE [DISTWIDTH] IN BLOOD BY AUTOMATED COUNT: 14.7 % (ref 11.6–15.1)
GFR SERPL CREATININE-BSD FRML MDRD: 49 ML/MIN/1.73SQ M
GLUCOSE SERPL-MCNC: 157 MG/DL (ref 65–140)
HCT VFR BLD AUTO: 46.2 % (ref 34.8–46.1)
HGB BLD-MCNC: 15.7 G/DL (ref 11.5–15.4)
LG PLATELETS BLD QL SMEAR: PRESENT
LIPASE SERPL-CCNC: 51 U/L (ref 73–393)
LYMPHOCYTES # BLD AUTO: 0.65 THOUSAND/UL (ref 0.6–4.47)
LYMPHOCYTES # BLD AUTO: 3 % (ref 14–44)
MCH RBC QN AUTO: 29.9 PG (ref 26.8–34.3)
MCHC RBC AUTO-ENTMCNC: 34 G/DL (ref 31.4–37.4)
MCV RBC AUTO: 88 FL (ref 82–98)
MONOCYTES # BLD AUTO: 0.65 THOUSAND/UL (ref 0–1.22)
MONOCYTES NFR BLD: 3 % (ref 4–12)
NEUTROPHILS # BLD MANUAL: 20.3 THOUSAND/UL (ref 1.85–7.62)
NEUTS BAND NFR BLD MANUAL: 2 % (ref 0–8)
NEUTS SEG NFR BLD AUTO: 92 % (ref 43–75)
NRBC BLD AUTO-RTO: 0 /100 WBCS
PLATELET # BLD AUTO: 448 THOUSANDS/UL (ref 149–390)
PLATELET BLD QL SMEAR: ABNORMAL
PMV BLD AUTO: 9.5 FL (ref 8.9–12.7)
POLYCHROMASIA BLD QL SMEAR: PRESENT
POTASSIUM SERPL-SCNC: 3.7 MMOL/L (ref 3.5–5.3)
PROT SERPL-MCNC: 8.5 G/DL (ref 6.4–8.2)
RBC # BLD AUTO: 5.25 MILLION/UL (ref 3.81–5.12)
RBC MORPH BLD: PRESENT
SODIUM SERPL-SCNC: 138 MMOL/L (ref 136–145)
TOTAL CELLS COUNTED SPEC: 100
WBC # BLD AUTO: 21.6 THOUSAND/UL (ref 4.31–10.16)

## 2020-05-30 PROCEDURE — 99284 EMERGENCY DEPT VISIT MOD MDM: CPT

## 2020-05-30 PROCEDURE — 85027 COMPLETE CBC AUTOMATED: CPT | Performed by: EMERGENCY MEDICINE

## 2020-05-30 PROCEDURE — 74177 CT ABD & PELVIS W/CONTRAST: CPT

## 2020-05-30 PROCEDURE — 96375 TX/PRO/DX INJ NEW DRUG ADDON: CPT

## 2020-05-30 PROCEDURE — 83690 ASSAY OF LIPASE: CPT | Performed by: EMERGENCY MEDICINE

## 2020-05-30 PROCEDURE — 80053 COMPREHEN METABOLIC PANEL: CPT | Performed by: EMERGENCY MEDICINE

## 2020-05-30 PROCEDURE — C9113 INJ PANTOPRAZOLE SODIUM, VIA: HCPCS | Performed by: EMERGENCY MEDICINE

## 2020-05-30 PROCEDURE — 36415 COLL VENOUS BLD VENIPUNCTURE: CPT | Performed by: EMERGENCY MEDICINE

## 2020-05-30 PROCEDURE — 96374 THER/PROPH/DIAG INJ IV PUSH: CPT

## 2020-05-30 PROCEDURE — 99283 EMERGENCY DEPT VISIT LOW MDM: CPT | Performed by: EMERGENCY MEDICINE

## 2020-05-30 PROCEDURE — 85007 BL SMEAR W/DIFF WBC COUNT: CPT | Performed by: EMERGENCY MEDICINE

## 2020-05-30 PROCEDURE — 96361 HYDRATE IV INFUSION ADD-ON: CPT

## 2020-05-30 RX ORDER — HALOPERIDOL 5 MG/ML
2 INJECTION INTRAMUSCULAR ONCE
Status: COMPLETED | OUTPATIENT
Start: 2020-05-30 | End: 2020-05-30

## 2020-05-30 RX ORDER — ONDANSETRON 4 MG/1
4 TABLET, ORALLY DISINTEGRATING ORAL EVERY 8 HOURS PRN
Qty: 20 TABLET | Refills: 0 | Status: SHIPPED | OUTPATIENT
Start: 2020-05-30 | End: 2020-06-06

## 2020-05-30 RX ORDER — PANTOPRAZOLE SODIUM 40 MG/1
40 INJECTION, POWDER, FOR SOLUTION INTRAVENOUS ONCE
Status: COMPLETED | OUTPATIENT
Start: 2020-05-30 | End: 2020-05-30

## 2020-05-30 RX ORDER — ONDANSETRON 2 MG/ML
4 INJECTION INTRAMUSCULAR; INTRAVENOUS ONCE
Status: COMPLETED | OUTPATIENT
Start: 2020-05-30 | End: 2020-05-30

## 2020-05-30 RX ADMIN — PANTOPRAZOLE SODIUM 40 MG: 40 INJECTION, POWDER, FOR SOLUTION INTRAVENOUS at 21:06

## 2020-05-30 RX ADMIN — IOHEXOL 100 ML: 350 INJECTION, SOLUTION INTRAVENOUS at 21:57

## 2020-05-30 RX ADMIN — SODIUM CHLORIDE 1000 ML: 0.9 INJECTION, SOLUTION INTRAVENOUS at 21:08

## 2020-05-30 RX ADMIN — HALOPERIDOL LACTATE 2 MG: 5 INJECTION, SOLUTION INTRAMUSCULAR at 22:29

## 2020-05-30 RX ADMIN — ONDANSETRON 4 MG: 2 INJECTION INTRAMUSCULAR; INTRAVENOUS at 21:06

## 2020-06-01 ENCOUNTER — ED HISTORICAL/CONVERTED ENCOUNTER (OUTPATIENT)
Dept: OTHER | Age: 55
End: 2020-06-01

## 2021-11-02 NOTE — PLAN OF CARE
Problem: Neurological Deficit  Goal: Neurological status is stable or improving  Interventions:  - Monitor and assess patient's level of consciousness, motor function, sensory function, and level of assistance needed for ADLs  - Monitor and report changes from baseline  Collaborate with interdisciplinary team to initiate plan and implement interventions as ordered  - Provide and maintain a safe environment  - Utilize seizure precautions  - Utilize fall precautions  - Utilize aspiration precautions  - Utilize bleeding precautions  Outcome: Progressing      Problem: Activity Intolerance/Impaired Mobility  Goal: Mobility/activity is maintained at optimum level for patient  Interventions:  - Assess and monitor patient  barriers to mobility and need for assistive/adaptive devices  - Assess patient's emotional response to limitations  - Collaborate with interdisciplinary team and initiate plans and interventions as ordered  - Encourage independent activity per ability   - Maintain proper body alignment  - Perform active/passive rom as tolerated/ordered  - Plan activities to conserve energy   - Turn patient   Outcome: Progressing      Problem: Communication Impairment  Goal: Ability to express needs and understand communication  Assess patient's communication skills and ability to understand information  Patient will demonstrate use of effective communication techniques, alternative methods of communication and understanding even if not able to speak  - Encourage communication and provide alternate methods of communication as needed  - Collaborate with case management/ for discharge needs  - Include patient/family/caregiver in decisions related to communication  Outcome: Progressing      Problem: Potential for Aspiration  Goal: Non-ventilated patient's risk of aspiration is minimized  Assess and monitor vital signs, respiratory status, and labs (WBC)    Monitor for signs of aspiration (tachypnea, cough, rales, wheezing, cyanosis, fever)  - Assess and monitor patient's ability to swallow  - Place patient up in chair to eat if possible  - HOB up at 90 degrees to eat if unable to get patient up into chair   - Supervise patient during oral intake  - Instruct patient to take small bites  - Instruct patient to take small single sips when taking liquids  - Follow patient-specific strategies generated by speech pathologist    Outcome: Progressing      Problem: Nutrition  Goal: Nutrition/Hydration status is improving  Monitor and assess patient's nutrition/hydration status for malnutrition (ex- brittle hair, bruises, dry skin, pale skin and conjunctiva, muscle wasting, smooth red tongue, and disorientation)  Collaborate with interdisciplinary team and initiate plan and interventions as ordered  Monitor patient's weight and dietary intake as ordered or per policy  Utilize nutrition screening tool and intervene per policy  Determine patient's food preferences and provide high-protein, high-caloric foods as appropriate  - Assist patient with eating   - Allow adequate time for meals   - Encourage patient to take dietary supplement as ordered  - Collaborate with clinical nutritionist   - Include patient/family/caregiver in decisions related to nutrition     Outcome: Progressing      Problem: PAIN - ADULT  Goal: Verbalizes/displays adequate comfort level or baseline comfort level  Interventions:  - Encourage patient to monitor pain and request assistance  - Assess pain using appropriate pain scale  - Administer analgesics based on type and severity of pain and evaluate response  - Implement non-pharmacological measures as appropriate and evaluate response  - Consider cultural and social influences on pain and pain management  - Notify physician/advanced practitioner if interventions unsuccessful or patient reports new pain   Outcome: Progressing      Problem: INFECTION - ADULT  Goal: Absence or prevention of progression during hospitalization  INTERVENTIONS:  - Assess and monitor for signs and symptoms of infection  - Monitor lab/diagnostic results  - Monitor all insertion sites, i e  indwelling lines, tubes, and drains  - Monitor endotracheal (as able) and nasal secretions for changes in amount and color  - [place] appropriate cooling/warming therapies per order  - Administer medications as ordered  - Instruct and encourage patient and family to use good hand hygiene technique  - Identify and instruct in appropriate isolation precautions for identified infection/condition   Outcome: Progressing      Problem: SAFETY ADULT  Goal: Patient will remain free of falls  INTERVENTIONS:  - Assess patient frequently for physical needs  -  Identify cognitive and physical deficits and behaviors that affect risk of falls    -  [place] fall precautions as indicated by assessment   - Educate patient/family on patient safety including physical limitations  - Instruct patient to call for assistance with activity based on assessment  - Modify environment to reduce risk of injury  - Consider OT/PT consult to assist with strengthening/mobility   Outcome: Progressing      Problem: DISCHARGE PLANNING  Goal: Discharge to home or other facility with appropriate resources  INTERVENTIONS:  - Identify barriers to discharge w/patient and caregiver  - Arrange for needed discharge resources and transportation as appropriate  - Identify discharge learning needs (meds, wound care, etc )  - Arrange for interpretive services to assist at discharge as needed  - Refer to Case Management Department for coordinating discharge planning if the patient needs post-hospital services based on physician/advanced practitioner order or complex needs related to functional status, cognitive ability, or social support system   Outcome: Progressing      Problem: Knowledge Deficit  Goal: Patient/family/caregiver demonstrates understanding of disease process, treatment plan, medications, and discharge instructions  Complete learning assessment and assess knowledge base  Interventions:  - Provide teaching at level of understanding  - Provide teaching via preferred learning methods   Outcome: Progressing      Problem: NEUROSENSORY - ADULT  Goal: Achieves stable or improved neurological status  INTERVENTIONS  - Monitor and report changes in neurological status  - Initiate measures to prevent increased intracranial pressure  - Maintain blood pressure and fluid volume within ordered parameters to optimize cerebral perfusion  - Monitor temperature, glucose, and sodium or any other associated labs   Initiate appropriate interventions as ordered  - Monitor for seizure activity   - Administer anti-seizure medications as ordered   Outcome: Progressing      Problem: GASTROINTESTINAL - ADULT  Goal: Minimal or absence of nausea and/or vomiting  INTERVENTIONS:  - Administer IV fluids as ordered to ensure adequate hydration  - Maintain NPO status until nausea and vomiting are resolved  - Nasogastric tube as ordered  - Administer ordered antiemetic medications as needed  - Provide nonpharmacologic comfort measures as appropriate  - Advance diet as tolerated, if ordered  - Nutrition services referral to assist patient with adequate nutrition and appropriate food choices   Outcome: Progressing      Problem: SKIN/TISSUE INTEGRITY - ADULT  Goal: Skin integrity remains intact  INTERVENTIONS  - Identify patients at risk for skin breakdown  - Assess and monitor skin integrity  - Assess and monitor nutrition and hydration status  - Monitor labs (i e  albumin)  - Assess for incontinence   - Turn and reposition patient  - Assist with mobility/ambulation  - Relieve pressure over bony prominences  - Avoid friction and shearing  - Provide appropriate hygiene as needed including keeping skin clean and dry  - Evaluate need for skin moisturizer/barrier cream  - Collaborate with interdisciplinary team (i e  Nutrition, Rehabilitation, etc )   - Patient/family teaching   Outcome: Progressing      Problem: MUSCULOSKELETAL - ADULT  Goal: Maintain or return mobility to safest level of function  INTERVENTIONS:  - Assess patient's ability to carry out ADLs; assess patient's baseline for ADL function and identify physical deficits which impact ability to perform ADLs (bathing, care of mouth/teeth, toileting, grooming, dressing, etc )  - Assess/evaluate cause of self-care deficits   - Assess range of motion  - Assess patient's mobility; develop plan if impaired  - Assess patient's need for assistive devices and provide as appropriate  - Encourage maximum independence but intervene and supervise when necessary  - Involve family in performance of ADLs  - Assess for home care needs following discharge   - Request OT consult to assist with ADL evaluation and planning for discharge  - Provide patient education as appropriate   Outcome: Progressing      Problem: Nutrition/Hydration-ADULT  Goal: Nutrient/Hydration intake appropriate for improving, restoring or maintaining nutritional needs  Monitor and assess patient's nutrition/hydration status for malnutrition (ex- brittle hair, bruises, dry skin, pale skin and conjunctiva, muscle wasting, smooth red tongue, and disorientation)  Collaborate with interdisciplinary team and initiate plan and interventions as ordered  Monitor patient's weight and dietary intake as ordered or per policy  Utilize nutrition screening tool and intervene per policy  Determine patient's food preferences and provide high-protein, high-caloric foods as appropriate       INTERVENTIONS:  - Monitor oral intake, urinary output, labs, and treatment plans  - Assess nutrition and hydration status and recommend course of action  - Evaluate amount of meals eaten  - Assist patient with eating if necessary   - Allow adequate time for meals  - Recommend/ encourage appropriate diets, oral nutritional supplements, and vitamin/mineral supplements  - Order, calculate, and assess calorie counts as needed  - Recommend, monitor, and adjust tube feedings and TPN/PPN based on assessed needs  - Assess need for intravenous fluids  - Provide specific nutrition/hydration education as appropriate  - Include patient/family/caregiver in decisions related to nutrition   Outcome: Progressing High Dose Vitamin A Pregnancy And Lactation Text: High dose vitamin A therapy is contraindicated during pregnancy and breast feeding.

## (undated) DEVICE — SYRINGE 50ML LL

## (undated) DEVICE — TUBING SUCTION 5MM X 12 FT

## (undated) DEVICE — SYRINGE 30ML LL

## (undated) DEVICE — 1200CC GUARDIAN II: Brand: GUARDIAN

## (undated) DEVICE — SINGLE-USE BIOPSY FORCEPS: Brand: RADIAL JAW 4